# Patient Record
Sex: FEMALE | Race: WHITE | NOT HISPANIC OR LATINO | Employment: OTHER | URBAN - METROPOLITAN AREA
[De-identification: names, ages, dates, MRNs, and addresses within clinical notes are randomized per-mention and may not be internally consistent; named-entity substitution may affect disease eponyms.]

---

## 2017-01-05 ENCOUNTER — SKIN CANCER EXAM (OUTPATIENT)
Dept: URBAN - METROPOLITAN AREA CLINIC 31 | Facility: CLINIC | Age: 47
Setting detail: DERMATOLOGY
End: 2017-01-05

## 2017-01-05 PROCEDURE — 99214 OFFICE O/P EST MOD 30 MIN: CPT

## 2017-01-26 ENCOUNTER — SEE NOTE (OUTPATIENT)
Dept: URBAN - METROPOLITAN AREA CLINIC 31 | Facility: CLINIC | Age: 47
Setting detail: DERMATOLOGY
End: 2017-01-26

## 2017-01-26 PROBLEM — L57.0 ACTINIC KERATOSIS: Status: ACTIVE | Noted: 2017-01-26

## 2017-01-26 PROBLEM — L57.0 ACTINIC KERATOSIS: Status: RESOLVED | Noted: 2017-01-26

## 2017-01-26 PROCEDURE — 17000 DESTRUCT PREMALG LESION: CPT

## 2017-01-26 PROCEDURE — 99213 OFFICE O/P EST LOW 20 MIN: CPT

## 2017-01-26 PROCEDURE — 17003 DESTRUCT PREMALG LES 2-14: CPT

## 2017-03-14 ENCOUNTER — RX ONLY (RX ONLY)
Age: 47
End: 2017-03-14

## 2017-03-14 RX ORDER — FLUOCINOLONE ACETONIDE 0.1 MG/ML
1 APPLICATION SOLUTION TOPICAL DAILY
Qty: 60 | Refills: 2 | Status: DISCONTINUED
Start: 2017-03-14 | End: 2020-02-13

## 2017-04-21 ENCOUNTER — SEE NOTE (OUTPATIENT)
Dept: URBAN - METROPOLITAN AREA CLINIC 31 | Facility: CLINIC | Age: 47
Setting detail: DERMATOLOGY
End: 2017-04-21

## 2017-04-21 PROBLEM — L57.0 ACTINIC KERATOSIS: Status: RESOLVED | Noted: 2017-04-21

## 2017-04-21 PROBLEM — L57.0 ACTINIC KERATOSIS: Status: ACTIVE | Noted: 2017-04-21

## 2017-04-21 PROCEDURE — 17000 DESTRUCT PREMALG LESION: CPT

## 2017-04-21 PROCEDURE — 99212 OFFICE O/P EST SF 10 MIN: CPT

## 2017-05-19 ENCOUNTER — OTHER - SEE NOTE (OUTPATIENT)
Dept: URBAN - METROPOLITAN AREA CLINIC 31 | Facility: CLINIC | Age: 47
Setting detail: DERMATOLOGY
End: 2017-05-19

## 2017-05-19 PROBLEM — L57.0 ACTINIC KERATOSIS: Status: RESOLVED | Noted: 2017-05-19

## 2017-05-19 PROCEDURE — 11100 BX SKIN SUBCUTANEOUS&/MUCOUS MEMBRANE 1 LESION: CPT

## 2017-05-19 PROCEDURE — 99213 OFFICE O/P EST LOW 20 MIN: CPT

## 2017-12-19 ENCOUNTER — APPOINTMENT (RX ONLY)
Dept: URBAN - METROPOLITAN AREA CLINIC 12 | Facility: CLINIC | Age: 47
Setting detail: DERMATOLOGY
End: 2017-12-19

## 2017-12-19 DIAGNOSIS — Z41.9 ENCOUNTER FOR PROCEDURE FOR PURPOSES OTHER THAN REMEDYING HEALTH STATE, UNSPECIFIED: ICD-10-CM

## 2017-12-19 PROCEDURE — ? BOTOX

## 2017-12-19 PROCEDURE — ? CONSULTATION - BOTULINUM TOXIN

## 2017-12-19 NOTE — PROCEDURE: BOTOX
Additional Area 3 Location: necklace lines
David Units: 0
Map Statment: See attached map for complete details
Detail Level: Simple
Expiration Date (Month Year): 06/20
Show Price In Note?: yes
Postcare Instructions: Patient instructed to not lie down for 4 hours and limit physical activity for 24 hours. Patient instructed not to travel by airplane for 48 hours.
Additional Area 2 Location: muscle spasm
Bill Summary Price Listed Below, Or Bill Total Of Units X Price Per Unit?: Bill #Units x Price Per Unit
Consent: Written consent was obtained prior to the procedure. Risks, benefits, expectations and alternatives were discussed including, but not limited to, infection, bleeding, lid/brow ptosis, bruising, swelling, diplopia, temporary effects, incomplete chemical denervation and dissatisfaction with the cosmetic outcome. No guarantee or warranty was given or implied regarding longevity of results.
Lot #: [id number]
Forehead Units: 5
Additional Area 1 Location: chin
Administered By (Optional): Camille Pugh
Price Per Unit In $ (Use Numbers Only, No Text Please.): 17
Glabellar Complex Units: 7.5

## 2017-12-19 NOTE — PROCEDURE: CONSULTATION - BOTULINUM TOXIN
Nasal Root Primary Toxin Units (Estimated): 0
Detail Level: Detailed
Send Procedure Quote As Charge: No

## 2017-12-28 ENCOUNTER — SKIN CANCER EXAM (OUTPATIENT)
Dept: URBAN - METROPOLITAN AREA CLINIC 31 | Facility: CLINIC | Age: 47
Setting detail: DERMATOLOGY
End: 2017-12-28

## 2017-12-28 PROBLEM — L57.0 ACTINIC KERATOSIS: Status: RESOLVED | Noted: 2017-12-28

## 2017-12-28 PROBLEM — L57.0 ACTINIC KERATOSIS: Status: ACTIVE | Noted: 2017-12-28

## 2017-12-28 PROCEDURE — 99214 OFFICE O/P EST MOD 30 MIN: CPT

## 2017-12-28 PROCEDURE — 17000 DESTRUCT PREMALG LESION: CPT

## 2017-12-28 PROCEDURE — 11100 BX SKIN SUBCUTANEOUS&/MUCOUS MEMBRANE 1 LESION: CPT

## 2017-12-28 PROCEDURE — 17003 DESTRUCT PREMALG LES 2-14: CPT

## 2017-12-28 PROCEDURE — 11101 BIOPSY SKIN SUBQ&/MUCOUS MEMBRANE EA ADDL LESION: CPT

## 2017-12-28 RX ORDER — TAZAROTENE 1 MG/G
1 APPLICATION GEL CUTANEOUS QHS
Qty: 90 | Refills: 4
Start: 2017-12-28

## 2018-01-02 ENCOUNTER — APPOINTMENT (RX ONLY)
Dept: URBAN - METROPOLITAN AREA CLINIC 12 | Facility: CLINIC | Age: 48
Setting detail: DERMATOLOGY
End: 2018-01-02

## 2018-01-02 DIAGNOSIS — Z42.8 ENCOUNTER FOR OTHER PLASTIC AND RECONSTRUCTIVE SURGERY FOLLOWING MEDICAL PROCEDURE OR HEALED INJURY: ICD-10-CM

## 2018-01-02 PROCEDURE — ? BOTOX

## 2018-01-02 NOTE — PROCEDURE: BOTOX
Show Price In Note?: yes
Price Per Unit In $ (Use Numbers Only, No Text Please.): 14
Forehead Units: 0
Additional Area 3 Location: necklace lines
Bill Summary Price Listed Below, Or Bill Total Of Units X Price Per Unit?: Bill #Units x Price Per Unit
Expiration Date (Month Year): 08/20
Periorbital Skin Units: 10
Dilution (U/0.1 Cc): 5
Additional Area 2 Location: muscle spasm
Postcare Instructions: Patient instructed to not lie down for 4 hours and limit physical activity for 24 hours. Patient instructed not to travel by airplane for 48 hours.
Administered By (Optional): Camille Pugh
Glabellar Complex Units: 2.5
Additional Area 1 Location: chin
Consent: Written consent was obtained prior to the procedure. Risks, benefits, expectations and alternatives were discussed including, but not limited to, infection, bleeding, lid/brow ptosis, bruising, swelling, diplopia, temporary effects, incomplete chemical denervation and dissatisfaction with the cosmetic outcome. No guarantee or warranty was given or implied regarding longevity of results.
Map Statment: See attached map for complete details
Detail Level: Simple
Lot #: [id number]

## 2018-01-08 ENCOUNTER — RX ONLY (RX ONLY)
Age: 48
End: 2018-01-08

## 2018-01-08 RX ORDER — TAZAROTENE 1 MG/G
1 GM AEROSOL, FOAM TOPICAL QHS
Qty: 100 | Refills: 6 | Status: DISCONTINUED
Start: 2018-01-08 | End: 2019-01-22

## 2018-01-15 ENCOUNTER — APPOINTMENT (RX ONLY)
Dept: URBAN - METROPOLITAN AREA CLINIC 12 | Facility: CLINIC | Age: 48
Setting detail: DERMATOLOGY
End: 2018-01-15

## 2018-01-15 DIAGNOSIS — Z42.8 ENCOUNTER FOR OTHER PLASTIC AND RECONSTRUCTIVE SURGERY FOLLOWING MEDICAL PROCEDURE OR HEALED INJURY: ICD-10-CM

## 2018-01-15 PROCEDURE — ? BOTOX

## 2018-01-15 NOTE — PROCEDURE: BOTOX
Dilution (U/0.1 Cc): 5
Consent: Written consent was obtained prior to the procedure. Risks, benefits, expectations and alternatives were discussed including, but not limited to, infection, bleeding, lid/brow ptosis, bruising, swelling, diplopia, temporary effects, incomplete chemical denervation and dissatisfaction with the cosmetic outcome. No guarantee or warranty was given or implied regarding longevity of results.
Lot #: [id number]
Additional Area 1 Units: 0
Use Map Statement For Sites (Optional): Yes
Detail Level: Simple
Map Statment: See attached map for complete details
Additional Area 3 Location: necklace lines
Price Per Unit In $ (Use Numbers Only, No Text Please.): 12
Additional Area 1 Location: chin
Expiration Date (Month Year): 08/20
Bill Summary Price Listed Below, Or Bill Total Of Units X Price Per Unit?: Bill #Units x Price Per Unit
Administered By (Optional): Camille Pugh
Postcare Instructions: Patient instructed to not lie down for 4 hours and limit physical activity for 24 hours. Patient instructed not to travel by airplane for 48 hours.
Additional Area 2 Location: muscle spasm

## 2018-02-20 ENCOUNTER — FOLLOW UP (OUTPATIENT)
Dept: URBAN - METROPOLITAN AREA CLINIC 31 | Facility: CLINIC | Age: 48
Setting detail: DERMATOLOGY
End: 2018-02-20

## 2018-02-20 PROBLEM — L57.0 ACTINIC KERATOSIS: Status: RESOLVED | Noted: 2018-02-20

## 2018-02-20 PROCEDURE — 17000 DESTRUCT PREMALG LESION: CPT

## 2018-02-20 RX ORDER — INGENOL MEBUTATE 150 UG/G
1 UNIT GEL TOPICAL ADD'L SIG
Qty: 3 | Refills: 0 | Status: DISCONTINUED
Start: 2018-02-20 | End: 2019-07-03

## 2018-03-27 ENCOUNTER — APPOINTMENT (RX ONLY)
Dept: URBAN - METROPOLITAN AREA CLINIC 12 | Facility: CLINIC | Age: 48
Setting detail: DERMATOLOGY
End: 2018-03-27

## 2018-03-27 DIAGNOSIS — Z41.9 ENCOUNTER FOR PROCEDURE FOR PURPOSES OTHER THAN REMEDYING HEALTH STATE, UNSPECIFIED: ICD-10-CM

## 2018-03-27 PROCEDURE — ? BOTOX

## 2018-03-27 NOTE — PROCEDURE: BOTOX
Postcare Instructions: Patient instructed to not lie down for 4 hours and limit physical activity for 24 hours. Patient instructed not to travel by airplane for 48 hours.
Map Statment: See attached map for complete details
Use Map Statement For Sites (Optional): Yes
Administered By (Optional): Camille Pugh
Forehead Units: 15
Additional Area 2 Units: 0
Detail Level: Simple
Additional Area 2 Location: nasalis depressor
Price Per Unit In $ (Use Numbers Only, No Text Please.): 17
Dilution (U/0.1 Cc): 5
Consent: Written consent was obtained prior to the procedure. Risks, benefits, expectations and alternatives were discussed including, but not limited to, infection, bleeding, lid/brow ptosis, bruising, swelling, diplopia, temporary effects, incomplete chemical denervation and dissatisfaction with the cosmetic outcome. No guarantee or warranty was given or implied regarding longevity of results.
Additional Area 3 Location: tail end of brows
Additional Area 1 Location: chin
Bill Summary Price Listed Below, Or Bill Total Of Units X Price Per Unit?: Bill #Units x Price Per Unit

## 2018-04-10 ENCOUNTER — APPOINTMENT (RX ONLY)
Dept: URBAN - METROPOLITAN AREA CLINIC 12 | Facility: CLINIC | Age: 48
Setting detail: DERMATOLOGY
End: 2018-04-10

## 2018-04-10 DIAGNOSIS — Z42.8 ENCOUNTER FOR OTHER PLASTIC AND RECONSTRUCTIVE SURGERY FOLLOWING MEDICAL PROCEDURE OR HEALED INJURY: ICD-10-CM

## 2018-04-10 PROCEDURE — ? BOTOX

## 2018-04-10 NOTE — PROCEDURE: BOTOX
Summary Price $ (Use Numbers Only, No Text Please.): 210
Show Price In Note?: yes
Nasal Root Units: 0
Postcare Instructions: Patient instructed to not lie down for 4 hours and limit physical activity for 24 hours. Patient instructed not to travel by airplane for 48 hours.
Administered By (Optional): Camille Pugh
Additional Area 2 Location: nasalis depressor
Consent: Written consent was obtained prior to the procedure. Risks, benefits, expectations and alternatives were discussed including, but not limited to, infection, bleeding, lid/brow ptosis, bruising, swelling, diplopia, temporary effects, incomplete chemical denervation and dissatisfaction with the cosmetic outcome. No guarantee or warranty was given or implied regarding longevity of results.
Dilution (U/0.1 Cc): 5
Additional Area 3 Location: tail end of brows
Lot #: [id number]
Bill Summary Price Listed Below, Or Bill Total Of Units X Price Per Unit?: Bill Summary Price Below
Periorbital Skin Units: 7.5
Detail Level: Simple
Forehead Units: 15
Additional Area 1 Location: chin
Expiration Date (Month Year): October 2020
Map Statment: See attached map for complete details

## 2018-04-20 ENCOUNTER — FOLLOW UP (OUTPATIENT)
Dept: URBAN - METROPOLITAN AREA CLINIC 31 | Facility: CLINIC | Age: 48
Setting detail: DERMATOLOGY
End: 2018-04-20

## 2018-04-20 PROBLEM — L57.0 ACTINIC KERATOSIS: Status: RESOLVED | Noted: 2018-04-20

## 2018-04-20 PROCEDURE — 17000 DESTRUCT PREMALG LESION: CPT

## 2018-04-24 ENCOUNTER — APPOINTMENT (RX ONLY)
Dept: URBAN - METROPOLITAN AREA CLINIC 12 | Facility: CLINIC | Age: 48
Setting detail: DERMATOLOGY
End: 2018-04-24

## 2018-04-24 DIAGNOSIS — Z42.8 ENCOUNTER FOR OTHER PLASTIC AND RECONSTRUCTIVE SURGERY FOLLOWING MEDICAL PROCEDURE OR HEALED INJURY: ICD-10-CM

## 2018-04-24 PROCEDURE — ? BOTOX

## 2018-04-24 NOTE — PROCEDURE: BOTOX
Expiration Date (Month Year): December 2020
Additional Area 2 Location: nasalis depressor
Nasal Root Units: 0
Dilution (U/0.1 Cc): 5
Administered By (Optional): Camille Pugh
Show Price In Note?: yes
Additional Area 1 Location: chin
Additional Area 3 Location: tail end of brows
Lot #: [id number]
Consent: Written consent was obtained prior to the procedure. Risks, benefits, expectations and alternatives were discussed including, but not limited to, infection, bleeding, lid/brow ptosis, bruising, swelling, diplopia, temporary effects, incomplete chemical denervation and dissatisfaction with the cosmetic outcome. No guarantee or warranty was given or implied regarding longevity of results.
Bill Summary Price Listed Below, Or Bill Total Of Units X Price Per Unit?: Bill Summary Price Below
Postcare Instructions: Patient instructed to not lie down for 4 hours and limit physical activity for 24 hours. Patient instructed not to travel by airplane for 48 hours.
Detail Level: Simple
Map Statment: See attached map for complete details
Price Per Unit In $ (Use Numbers Only, No Text Please.): 17
Periorbital Skin Units: 1

## 2018-06-01 ENCOUNTER — APPOINTMENT (RX ONLY)
Dept: URBAN - METROPOLITAN AREA CLINIC 12 | Facility: CLINIC | Age: 48
Setting detail: DERMATOLOGY
End: 2018-06-01

## 2018-06-01 DIAGNOSIS — Z41.9 ENCOUNTER FOR PROCEDURE FOR PURPOSES OTHER THAN REMEDYING HEALTH STATE, UNSPECIFIED: ICD-10-CM

## 2018-06-01 PROCEDURE — ? BOTOX

## 2018-06-01 NOTE — PROCEDURE: BOTOX
Platsyma Units: 0
Lot #: [id number]
Dilution (U/0.1 Cc): 5
Consent: Written consent was obtained prior to the procedure. Risks, benefits, expectations and alternatives were discussed including, but not limited to, infection, bleeding, lid/brow ptosis, bruising, swelling, diplopia, temporary effects, incomplete chemical denervation and dissatisfaction with the cosmetic outcome. No guarantee or warranty was given or implied regarding longevity of results.
Additional Area 3 Location: tail end of brows
Additional Area 2 Location: nasalis depressor
Bill Summary Price Listed Below, Or Bill Total Of Units X Price Per Unit?: Bill #Units x Price Per Unit
Additional Area 1 Location: chin
Use Map Statement For Sites (Optional): Yes
Detail Level: Simple
Periorbital Skin Units: 20
Forehead Units: 2.5
Glabellar Complex Units: 7.5
Postcare Instructions: Patient instructed to not lie down for 4 hours and limit physical activity for 24 hours. Patient instructed not to travel by airplane for 48 hours.
Price Per Unit In $ (Use Numbers Only, No Text Please.): 17
Map Statment: See attached map for complete details
Administered By (Optional): Camille Pugh
Expiration Date (Month Year): 12/2020

## 2018-06-13 ENCOUNTER — APPOINTMENT (RX ONLY)
Dept: URBAN - METROPOLITAN AREA CLINIC 12 | Facility: CLINIC | Age: 48
Setting detail: DERMATOLOGY
End: 2018-06-13

## 2018-06-13 DIAGNOSIS — Z42.8 ENCOUNTER FOR OTHER PLASTIC AND RECONSTRUCTIVE SURGERY FOLLOWING MEDICAL PROCEDURE OR HEALED INJURY: ICD-10-CM

## 2018-06-13 PROCEDURE — ? BOTOX

## 2018-06-13 NOTE — PROCEDURE: BOTOX
Dilution (U/0.1 Cc): 5
Additional Area 3 Location: orbicularis
Price Per Unit In $ (Use Numbers Only, No Text Please.): 0
Postcare Instructions: Patient instructed to not lie down for 4 hours and limit physical activity for 24 hours. Patient instructed not to travel by airplane for 48 hours.
Detail Level: Simple
Lot #: [id number]
Administered By (Optional): Camille Pugh
Additional Area 1 Location: chin
Consent: Written consent was obtained prior to the procedure. Risks, benefits, expectations and alternatives were discussed including, but not limited to, infection, bleeding, lid/brow ptosis, bruising, swelling, diplopia, temporary effects, incomplete chemical denervation and dissatisfaction with the cosmetic outcome. No guarantee or warranty was given or implied regarding longevity of results.
Use Map Statement For Sites (Optional): Yes
Glabellar Complex Units: 1
Map Statment: See attached map for complete details
Expiration Date (Month Year): 12/2020
Additional Area 2 Location: nasalis depressor
Bill Summary Price Listed Below, Or Bill Total Of Units X Price Per Unit?: Bill #Units x Price Per Unit

## 2018-08-10 ENCOUNTER — APPOINTMENT (RX ONLY)
Dept: URBAN - METROPOLITAN AREA CLINIC 12 | Facility: CLINIC | Age: 48
Setting detail: DERMATOLOGY
End: 2018-08-10

## 2018-08-10 DIAGNOSIS — Z41.9 ENCOUNTER FOR PROCEDURE FOR PURPOSES OTHER THAN REMEDYING HEALTH STATE, UNSPECIFIED: ICD-10-CM

## 2018-08-10 PROCEDURE — ? BOTOX

## 2018-08-10 NOTE — PROCEDURE: BOTOX
Forehead Units: 10
Additional Area 1 Units: 0
Administered By (Optional): Camille Pugh
Periorbital Skin Units: 7.5
Additional Area 3 Location: tail end of brows
Show Price In Note?: yes
Expiration Date (Month Year): 12/2020
Price Per Unit In $ (Use Numbers Only, No Text Please.): 15
Consent: Written consent was obtained prior to the procedure. Risks, benefits, expectations and alternatives were discussed including, but not limited to, infection, bleeding, lid/brow ptosis, bruising, swelling, diplopia, temporary effects, incomplete chemical denervation and dissatisfaction with the cosmetic outcome. No guarantee or warranty was given or implied regarding longevity of results.
Detail Level: Simple
Lot #: [id number]
Dilution (U/0.1 Cc): 5
Bill Summary Price Listed Below, Or Bill Total Of Units X Price Per Unit?: Bill #Units x Price Per Unit
Additional Area 2 Location: nasalis depressor
Postcare Instructions: Patient instructed to not lie down for 4 hours and limit physical activity for 24 hours. Patient instructed not to travel by airplane for 48 hours.
Map Statment: See attached map for complete details
Additional Area 1 Location: chin

## 2018-08-31 ENCOUNTER — APPOINTMENT (RX ONLY)
Dept: URBAN - METROPOLITAN AREA CLINIC 12 | Facility: CLINIC | Age: 48
Setting detail: DERMATOLOGY
End: 2018-08-31

## 2018-08-31 DIAGNOSIS — Z42.8 ENCOUNTER FOR OTHER PLASTIC AND RECONSTRUCTIVE SURGERY FOLLOWING MEDICAL PROCEDURE OR HEALED INJURY: ICD-10-CM

## 2018-08-31 PROCEDURE — ? BOTOX

## 2018-08-31 NOTE — PROCEDURE: BOTOX
Forehead Units: 2.5
Additional Area 1 Location: chin
David Units: 0
Use Map Statement For Sites (Optional): Yes
Dilution (U/0.1 Cc): 5
Map Statment: See attached map for complete details
Consent: Written consent was obtained prior to the procedure. Risks, benefits, expectations and alternatives were discussed including, but not limited to, infection, bleeding, lid/brow ptosis, bruising, swelling, diplopia, temporary effects, incomplete chemical denervation and dissatisfaction with the cosmetic outcome. No guarantee or warranty was given or implied regarding longevity of results.
Detail Level: Simple
Expiration Date (Month Year): 03/2021
Administered By (Optional): Camille Pugh
Additional Area 2 Location: tail end of brow
Bill Summary Price Listed Below, Or Bill Total Of Units X Price Per Unit?: Bill #Units x Price Per Unit
Lot #: [id number]
Postcare Instructions: Patient instructed to not lie down for 4 hours and limit physical activity for 24 hours. Patient instructed not to travel by airplane for 48 hours.
Price Per Unit In $ (Use Numbers Only, No Text Please.): 14
Periorbital Skin Units: 12.5
Additional Area 3 Location: necklace line

## 2018-11-19 ENCOUNTER — APPOINTMENT (RX ONLY)
Dept: URBAN - METROPOLITAN AREA CLINIC 12 | Facility: CLINIC | Age: 48
Setting detail: DERMATOLOGY
End: 2018-11-19

## 2018-11-19 DIAGNOSIS — Z41.9 ENCOUNTER FOR PROCEDURE FOR PURPOSES OTHER THAN REMEDYING HEALTH STATE, UNSPECIFIED: ICD-10-CM

## 2018-11-19 PROCEDURE — ? BOTOX

## 2018-11-19 NOTE — PROCEDURE: BOTOX
Detail Level: Simple
Forehead Units: 5
Additional Area 1 Location: [facility]
Additional Area 1 Units: 0
Additional Area 3 Location: nasalis depressor
Price Per Unit In $ (Use Numbers Only, No Text Please.): 17
Reconstitution Date: 11/19/2018
Expiration Date (Month Year): 05/2021
Use Map Statement For Sites (Optional): Yes
Postcare Instructions: Patient instructed to not lie down for 4 hours and limit physical activity for 24 hours. Patient instructed not to travel by airplane for 48 hours.
Lot #: [id number]
Additional Area 2 Location: tail end of brow
Administered By (Optional): Camille Pugh
Map Statment: See attached map for complete details
Consent: Written consent was obtained prior to the procedure. Risks, benefits, expectations and alternatives were discussed including, but not limited to, infection, bleeding, lid/brow ptosis, bruising, swelling, diplopia, temporary effects, incomplete chemical denervation and dissatisfaction with the cosmetic outcome. No guarantee or warranty was given or implied regarding longevity of results.
Bill Summary Price Listed Below, Or Bill Total Of Units X Price Per Unit?: Bill Summary Price Below
Periorbital Skin Units: 15
Glabellar Complex Units: 10
Summary Price $ (Use Numbers Only, No Text Please.): 510.00

## 2018-12-11 ENCOUNTER — APPOINTMENT (RX ONLY)
Dept: URBAN - METROPOLITAN AREA CLINIC 12 | Facility: CLINIC | Age: 48
Setting detail: DERMATOLOGY
End: 2018-12-11

## 2018-12-11 DIAGNOSIS — Z42.8 ENCOUNTER FOR OTHER PLASTIC AND RECONSTRUCTIVE SURGERY FOLLOWING MEDICAL PROCEDURE OR HEALED INJURY: ICD-10-CM

## 2018-12-11 PROCEDURE — ? BOTOX

## 2018-12-11 NOTE — PROCEDURE: BOTOX
Reconstitution Date: 12/10/2018
Detail Level: Simple
Platsyma Units: 0
Bill Summary Price Listed Below, Or Bill Total Of Units X Price Per Unit?: Bill Summary Price Below
Dilution (U/0.1 Cc): 5
Lot #: [id number]
Administered By (Optional): Camille Pugh
Use Map Statement For Sites (Optional): Yes
Expiration Date (Month Year): 05/2021
Postcare Instructions: Patient instructed to not lie down for 4 hours and limit physical activity for 24 hours. Patient instructed not to travel by airplane for 48 hours.
Additional Area 2 Location: tail end of brow
Additional Area 1 Location: [facility]
Price Per Unit In $ (Use Numbers Only, No Text Please.): 14.00
Map Statment: See attached map for complete details
Additional Area 3 Location: nasalis depressor
Periorbital Skin Units: 17.5
Summary Price $ (Use Numbers Only, No Text Please.): 245.00
Consent: Written consent was obtained prior to the procedure. Risks, benefits, expectations and alternatives were discussed including, but not limited to, infection, bleeding, lid/brow ptosis, bruising, swelling, diplopia, temporary effects, incomplete chemical denervation and dissatisfaction with the cosmetic outcome. No guarantee or warranty was given or implied regarding longevity of results.

## 2018-12-21 ENCOUNTER — RX ONLY (RX ONLY)
Age: 48
End: 2018-12-21

## 2018-12-21 RX ORDER — FLUOCINOLONE ACETONIDE 0.1 MG/ML
ADD'L SIG ADD'L SIG SOLUTION TOPICAL ADD'L SIG
Qty: 60 | Refills: 2 | Status: DISCONTINUED
Start: 2018-12-21 | End: 2020-02-13

## 2019-01-22 ENCOUNTER — SKIN CANCER EXAM (OUTPATIENT)
Dept: URBAN - METROPOLITAN AREA CLINIC 31 | Facility: CLINIC | Age: 49
Setting detail: DERMATOLOGY
End: 2019-01-22

## 2019-01-22 ENCOUNTER — RX ONLY (RX ONLY)
Age: 49
End: 2019-01-22

## 2019-01-22 PROCEDURE — 99214 OFFICE O/P EST MOD 30 MIN: CPT

## 2019-01-22 RX ORDER — TAZAROTENE 1 MG/G
1 GM AEROSOL, FOAM TOPICAL QHS
Qty: 100 | Refills: 6 | Status: DISCONTINUED
Start: 2019-01-22 | End: 2020-02-13

## 2019-02-12 ENCOUNTER — APPOINTMENT (RX ONLY)
Dept: URBAN - METROPOLITAN AREA CLINIC 12 | Facility: CLINIC | Age: 49
Setting detail: DERMATOLOGY
End: 2019-02-12

## 2019-02-12 DIAGNOSIS — Z41.9 ENCOUNTER FOR PROCEDURE FOR PURPOSES OTHER THAN REMEDYING HEALTH STATE, UNSPECIFIED: ICD-10-CM

## 2019-02-12 PROCEDURE — ? BOTOX

## 2019-02-12 NOTE — PROCEDURE: BOTOX
David Units: 0
Show Price In Note?: yes
Glabellar Complex Units: 10
Dilution (U/0.1 Cc): 5
Additional Area 1 Location: [facility]
Bill Summary Price Listed Below, Or Bill Total Of Units X Price Per Unit?: Bill #Units x Price Per Unit
Postcare Instructions: Patient instructed to not lie down for 4 hours and limit physical activity for 24 hours. Patient instructed not to travel by airplane for 48 hours.
Map Statment: See attached map for complete details
Additional Area 3 Location: necklace lines
Expiration Date (Month Year): 08/2021
Price Per Unit In $ (Use Numbers Only, No Text Please.): 17
Detail Level: Simple
Additional Area 2 Location: Chin
Lot #: [id number]
Periorbital Skin Units: 20
Consent: Written consent was obtained prior to the procedure. Risks, benefits, expectations and alternatives were discussed including, but not limited to, infection, bleeding, lid/brow ptosis, bruising, swelling, diplopia, temporary effects, incomplete chemical denervation and dissatisfaction with the cosmetic outcome. No guarantee or warranty was given or implied regarding longevity of results.
Administered By (Optional): Camille Pugh

## 2019-02-25 ENCOUNTER — APPOINTMENT (RX ONLY)
Dept: URBAN - METROPOLITAN AREA CLINIC 12 | Facility: CLINIC | Age: 49
Setting detail: DERMATOLOGY
End: 2019-02-25

## 2019-02-25 DIAGNOSIS — Z42.8 ENCOUNTER FOR OTHER PLASTIC AND RECONSTRUCTIVE SURGERY FOLLOWING MEDICAL PROCEDURE OR HEALED INJURY: ICD-10-CM

## 2019-02-25 PROCEDURE — ? BOTOX

## 2019-02-25 NOTE — PROCEDURE: BOTOX
Additional Area 1 Location: [facility]
Additional Area 1 Units: 0
Additional Area 3 Location: necklace lines
Dilution (U/0.1 Cc): 5
Forehead Units: 7.5
Show Price In Note?: yes
Map Statment: See attached map for complete details
Price Per Unit In $ (Use Numbers Only, No Text Please.): 14
Detail Level: Simple
Lot #: [id number]
Administered By (Optional): Camille Pugh
Periorbital Skin Units: 2.5
Additional Area 2 Location: Chin
Expiration Date (Month Year): 04/2021
Bill Summary Price Listed Below, Or Bill Total Of Units X Price Per Unit?: Bill #Units x Price Per Unit
Consent: Written consent was obtained prior to the procedure. Risks, benefits, expectations and alternatives were discussed including, but not limited to, infection, bleeding, lid/brow ptosis, bruising, swelling, diplopia, temporary effects, incomplete chemical denervation and dissatisfaction with the cosmetic outcome. No guarantee or warranty was given or implied regarding longevity of results.
Postcare Instructions: Patient instructed to not lie down for 4 hours and limit physical activity for 24 hours. Patient instructed not to travel by airplane for 48 hours.

## 2019-05-14 ENCOUNTER — APPOINTMENT (RX ONLY)
Dept: URBAN - METROPOLITAN AREA CLINIC 12 | Facility: CLINIC | Age: 49
Setting detail: DERMATOLOGY
End: 2019-05-14

## 2019-05-14 DIAGNOSIS — Z41.9 ENCOUNTER FOR PROCEDURE FOR PURPOSES OTHER THAN REMEDYING HEALTH STATE, UNSPECIFIED: ICD-10-CM

## 2019-05-14 PROCEDURE — ? BOTOX

## 2019-05-14 NOTE — PROCEDURE: BOTOX
Periorbital Skin Units: 20
Platsyma Units: 0
Administered By (Optional): Camille Pugh
Lot #: [id number]
Show Price In Note?: yes
Glabellar Complex Units: 7.5
Bill Summary Price Listed Below, Or Bill Total Of Units X Price Per Unit?: Bill #Units x Price Per Unit
Additional Area 1 Location: in the brow
Dilution (U/0.1 Cc): 5
Postcare Instructions: Patient instructed to not lie down for 4 hours and limit physical activity for 24 hours. Patient instructed not to travel by airplane for 48 hours.
Price Per Unit In $ (Use Numbers Only, No Text Please.): 17
Additional Area 2 Location: Chin
Expiration Date (Month Year): 08/2021
Map Statment: See attached map for complete details
Consent: Written consent was obtained prior to the procedure. Risks, benefits, expectations and alternatives were discussed including, but not limited to, infection, bleeding, lid/brow ptosis, bruising, swelling, diplopia, temporary effects, incomplete chemical denervation and dissatisfaction with the cosmetic outcome. No guarantee or warranty was given or implied regarding longevity of results.
Detail Level: Simple
Additional Area 3 Location: necklace lines

## 2019-05-29 ENCOUNTER — APPOINTMENT (RX ONLY)
Dept: URBAN - METROPOLITAN AREA CLINIC 12 | Facility: CLINIC | Age: 49
Setting detail: DERMATOLOGY
End: 2019-05-29

## 2019-05-29 DIAGNOSIS — Z42.8 ENCOUNTER FOR OTHER PLASTIC AND RECONSTRUCTIVE SURGERY FOLLOWING MEDICAL PROCEDURE OR HEALED INJURY: ICD-10-CM

## 2019-05-29 PROCEDURE — ? BOTOX

## 2019-05-29 NOTE — PROCEDURE: BOTOX
Perioral Units: 0
Additional Area 3 Location: necklace lines
Lot #: [id number]
Map Statment: See attached map for complete details
Periorbital Skin Units: 2.5
Consent: Written consent was obtained prior to the procedure. Risks, benefits, expectations and alternatives were discussed including, but not limited to, infection, bleeding, lid/brow ptosis, bruising, swelling, diplopia, temporary effects, incomplete chemical denervation and dissatisfaction with the cosmetic outcome. No guarantee or warranty was given or implied regarding longevity of results.
Show Price In Note?: yes
Additional Area 2 Location: Chin
Detail Level: Simple
Administered By (Optional): Camille Pugh
Postcare Instructions: Patient instructed to not lie down for 4 hours and limit physical activity for 24 hours. Patient instructed not to travel by airplane for 48 hours.
Bill Summary Price Listed Below, Or Bill Total Of Units X Price Per Unit?: Bill #Units x Price Per Unit
Price Per Unit In $ (Use Numbers Only, No Text Please.): 14
Additional Area 1 Location: in the brow
Expiration Date (Month Year): 08/2021
Forehead Units: 7.5
Dilution (U/0.1 Cc): 5

## 2019-07-03 ENCOUNTER — RX ONLY (RX ONLY)
Age: 49
End: 2019-07-03

## 2019-07-03 RX ORDER — INGENOL MEBUTATE 150 UG/G
1 UNIT GEL TOPICAL ADD'L SIG
Qty: 3 | Refills: 0 | Status: DISCONTINUED
Start: 2019-07-03 | End: 2020-02-13

## 2019-07-23 PROBLEM — L57.0 ACTINIC KERATOSIS: Status: RESOLVED | Noted: 2019-07-23

## 2019-08-14 ENCOUNTER — APPOINTMENT (RX ONLY)
Dept: URBAN - METROPOLITAN AREA CLINIC 12 | Facility: CLINIC | Age: 49
Setting detail: DERMATOLOGY
End: 2019-08-14

## 2019-08-14 DIAGNOSIS — Z41.9 ENCOUNTER FOR PROCEDURE FOR PURPOSES OTHER THAN REMEDYING HEALTH STATE, UNSPECIFIED: ICD-10-CM

## 2019-08-14 PROCEDURE — ? BOTOX

## 2019-08-14 NOTE — PROCEDURE: BOTOX
Additional Area 3 Location: [facility]
Additional Area 3 Units: 0
Postcare Instructions: Patient instructed to not lie down for 4 hours and limit physical activity for 24 hours. Patient instructed not to travel by airplane for 48 hours.
Additional Area 1 Location: in the brow
Consent: Written consent was obtained prior to the procedure. Risks, benefits, expectations and alternatives were discussed including, but not limited to, infection, bleeding, lid/brow ptosis, bruising, swelling, diplopia, temporary effects, incomplete chemical denervation and dissatisfaction with the cosmetic outcome. No guarantee or warranty was given or implied regarding longevity of results.
Price Per Unit In $ (Use Numbers Only, No Text Please.): 17
Glabellar Complex Units: 5
Map Statment: See attached map for complete details
Forehead Units: 7.5
Administered By (Optional): Camille Pugh
Periorbital Skin Units: 20
Use Map Statement For Sites (Optional): Yes
Lot #: [id number]
Bill Summary Price Listed Below, Or Bill Total Of Units X Price Per Unit?: Bill #Units x Price Per Unit
Expiration Date (Month Year): 01/2022
Detail Level: Simple
Additional Area 2 Location: Chin

## 2019-08-19 ENCOUNTER — RX ONLY (RX ONLY)
Age: 49
End: 2019-08-19

## 2019-08-19 RX ORDER — FLUOROURACIL 50 MG/G
1 APPLICATION CREAM TOPICAL NIGHTLY
Qty: 40 | Refills: 1 | Status: DISCONTINUED
Start: 2019-08-19 | End: 2020-02-13

## 2019-09-03 ENCOUNTER — APPOINTMENT (RX ONLY)
Dept: URBAN - METROPOLITAN AREA CLINIC 12 | Facility: CLINIC | Age: 49
Setting detail: DERMATOLOGY
End: 2019-09-03

## 2019-09-03 DIAGNOSIS — Z42.8 ENCOUNTER FOR OTHER PLASTIC AND RECONSTRUCTIVE SURGERY FOLLOWING MEDICAL PROCEDURE OR HEALED INJURY: ICD-10-CM

## 2019-09-03 PROCEDURE — ? BOTOX

## 2019-09-03 NOTE — PROCEDURE: BOTOX
Lot #: [id number]
Perioral Units: 0
Consent: Written consent was obtained prior to the procedure. Risks, benefits, expectations and alternatives were discussed including, but not limited to, infection, bleeding, lid/brow ptosis, bruising, swelling, diplopia, temporary effects, incomplete chemical denervation and dissatisfaction with the cosmetic outcome. No guarantee or warranty was given or implied regarding longevity of results.
Map Statment: See attached map for complete details
Detail Level: Simple
Additional Area 1 Location: nasalis depressor
Use Map Statement For Sites (Optional): Yes
Forehead Units: 5
Bill Summary Price Listed Below, Or Bill Total Of Units X Price Per Unit?: Bill #Units x Price Per Unit
Additional Area 3 Location: necklace lines
Price Per Unit In $ (Use Numbers Only, No Text Please.): 14
Periorbital Skin Units: 2.5
Postcare Instructions: Patient instructed to not lie down for 4 hours and limit physical activity for 24 hours. Patient instructed not to travel by airplane for 48 hours.
Expiration Date (Month Year): 01/2022
Additional Area 2 Location: Chin
Administered By (Optional): Camille Pugh

## 2019-11-22 ENCOUNTER — APPOINTMENT (RX ONLY)
Dept: URBAN - METROPOLITAN AREA CLINIC 12 | Facility: CLINIC | Age: 49
Setting detail: DERMATOLOGY
End: 2019-11-22

## 2019-11-22 DIAGNOSIS — Z41.9 ENCOUNTER FOR PROCEDURE FOR PURPOSES OTHER THAN REMEDYING HEALTH STATE, UNSPECIFIED: ICD-10-CM

## 2019-11-22 PROCEDURE — ? XEOMIN

## 2019-11-22 NOTE — PROCEDURE: XEOMIN
Expiration Date (Month Year): 09/2021
Detail Level: Detailed
Additional Area 2 Units: 0
Use Map Statement For Sites (Optional): No
Forehead Units: 5
Consent: Written consent was obtained prior to the procedure. Risks, benefits, expectations and alternatives were discussed including, but not limited to, infection, bleeding, lid/brow ptosis, bruising, swelling, diplopia, temporary effects, incomplete chemical denervation and dissatisfaction with the cosmetic outcome. No guarantee or warranty was given or implied regarding longevity of results.
Map Statement: See attached map for complete details
Lot #: [id number]
Periorbital Skin Units: 25
Show Price In Note?: yes
Bill Summary Price Listed Below, Or Bill Total Of Units X Price Per Unit?: Bill #Units x Price Per Unit
Price Per Unit In $ (Use Numbers Only, No Text Please.): 17
Glabellar Complex Units: 10
Postcare Instructions: Patient instructed to not lie down for 4 hours and limit physical activity for 24 hours. Patient instructed not to travel by airplane for 48 hours.

## 2019-12-04 ENCOUNTER — APPOINTMENT (RX ONLY)
Dept: URBAN - METROPOLITAN AREA CLINIC 12 | Facility: CLINIC | Age: 49
Setting detail: DERMATOLOGY
End: 2019-12-04

## 2019-12-04 DIAGNOSIS — Z42.8 ENCOUNTER FOR OTHER PLASTIC AND RECONSTRUCTIVE SURGERY FOLLOWING MEDICAL PROCEDURE OR HEALED INJURY: ICD-10-CM

## 2019-12-04 PROCEDURE — ? XEOMIN

## 2019-12-04 NOTE — PROCEDURE: XEOMIN
Periorbital Skin Units: 5
Expiration Date (Month Year): 10/2021
Price Per Unit In $ (Use Numbers Only, No Text Please.): 14
Nasal Root Units: 0
Lot #: [id number]
Map Statement: See attached map for complete details
Bill Summary Price Listed Below, Or Bill Total Of Units X Price Per Unit?: Bill #Units x Price Per Unit
Show Price In Note?: yes
Consent: Written consent was obtained prior to the procedure. Risks, benefits, expectations and alternatives were discussed including, but not limited to, infection, bleeding, lid/brow ptosis, bruising, swelling, diplopia, temporary effects, incomplete chemical denervation and dissatisfaction with the cosmetic outcome. No guarantee or warranty was given or implied regarding longevity of results.
Postcare Instructions: Patient instructed to not lie down for 4 hours and limit physical activity for 24 hours. Patient instructed not to travel by airplane for 48 hours.
Detail Level: Detailed
Use Map Statement For Sites (Optional): No

## 2020-02-13 ENCOUNTER — SKIN CANCER EXAM (OUTPATIENT)
Dept: URBAN - METROPOLITAN AREA CLINIC 31 | Facility: CLINIC | Age: 50
Setting detail: DERMATOLOGY
End: 2020-02-13

## 2020-02-13 DIAGNOSIS — L57.0 ACTINIC KERATOSIS: ICD-10-CM

## 2020-02-13 PROBLEM — D18.01 HEMANGIOMA OF SKIN AND SUBCUTANEOUS TISSUE: Status: ACTIVE | Noted: 2020-02-13

## 2020-02-13 PROBLEM — D18.01 HEMANGIOMA OF SKIN AND SUBCUTANEOUS TISSUE: Status: RESOLVED | Noted: 2020-02-13

## 2020-02-13 PROCEDURE — 99214 OFFICE O/P EST MOD 30 MIN: CPT

## 2020-02-13 PROCEDURE — 11300 SHAVE SKIN LESION 0.5 CM/<: CPT

## 2020-02-13 RX ORDER — TAZAROTENE 1 MG/G
1 APPLICATION GEL CUTANEOUS QHS
Qty: 90 | Refills: 6
Start: 2020-02-13

## 2020-02-21 ENCOUNTER — APPOINTMENT (RX ONLY)
Dept: URBAN - METROPOLITAN AREA CLINIC 12 | Facility: CLINIC | Age: 50
Setting detail: DERMATOLOGY
End: 2020-02-21

## 2020-02-21 DIAGNOSIS — Z41.9 ENCOUNTER FOR PROCEDURE FOR PURPOSES OTHER THAN REMEDYING HEALTH STATE, UNSPECIFIED: ICD-10-CM

## 2020-02-21 PROCEDURE — ? XEOMIN

## 2020-02-21 NOTE — PROCEDURE: XEOMIN
Show Price In Note?: yes
Glabellar Complex Units: 10
Dilution (U/0.1 Cc): 5
Bill Summary Price Listed Below, Or Bill Total Of Units X Price Per Unit?: Bill #Units x Price Per Unit
Use Map Statement For Sites (Optional): No
Additional Area 2 Units: 0
Detail Level: Detailed
Map Statement: See attached map for complete details
Expiration Date (Month Year): 01/2022
Postcare Instructions: Patient instructed to not lie down for 4 hours and limit physical activity for 24 hours. Patient instructed not to travel by airplane for 48 hours.
Price Per Unit In $ (Use Numbers Only, No Text Please.): 17
Periorbital Skin Units: 25
Lot #: [id number]
Consent: Written consent was obtained prior to the procedure. Risks, benefits, expectations and alternatives were discussed including, but not limited to, infection, bleeding, lid/brow ptosis, bruising, swelling, diplopia, temporary effects, incomplete chemical denervation and dissatisfaction with the cosmetic outcome. No guarantee or warranty was given or implied regarding longevity of results.

## 2020-05-06 ENCOUNTER — APPOINTMENT (RX ONLY)
Dept: URBAN - METROPOLITAN AREA CLINIC 12 | Facility: CLINIC | Age: 50
Setting detail: DERMATOLOGY
End: 2020-05-06

## 2020-05-06 DIAGNOSIS — Z41.9 ENCOUNTER FOR PROCEDURE FOR PURPOSES OTHER THAN REMEDYING HEALTH STATE, UNSPECIFIED: ICD-10-CM

## 2020-05-06 PROCEDURE — ? BOTOX

## 2020-05-06 NOTE — PROCEDURE: BOTOX
Lot #: [id number]
Bill Summary Price Listed Below, Or Bill Total Of Units X Price Per Unit?: Bill #Units x Price Per Unit
Price Per Unit In $ (Use Numbers Only, No Text Please.): 17
Map Statment: See attached map for complete details
Forehead Units: 5
Expiration Date (Month Year): 09/2022
Nasal Root Units: 0
Consent: Written consent was obtained prior to the procedure. Risks, benefits, expectations and alternatives were discussed including, but not limited to, infection, bleeding, lid/brow ptosis, bruising, swelling, diplopia, temporary effects, incomplete chemical denervation and dissatisfaction with the cosmetic outcome. No guarantee or warranty was given or implied regarding longevity of results.
Postcare Instructions: Patient instructed to not lie down for 4 hours and limit physical activity for 24 hours. Patient instructed not to travel by airplane for 48 hours.
Detail Level: Simple
Use Map Statement For Sites (Optional): Yes
Periorbital Skin Units: 25
Additional Area 2 Location: Chin
Administered By (Optional): Camille Pugh
Additional Area 3 Location: necklace lines
Additional Area 1 Location: in the brow
Glabellar Complex Units: 10

## 2020-05-15 ENCOUNTER — APPOINTMENT (RX ONLY)
Dept: URBAN - METROPOLITAN AREA CLINIC 12 | Facility: CLINIC | Age: 50
Setting detail: DERMATOLOGY
End: 2020-05-15

## 2020-05-15 DIAGNOSIS — Z42.8 ENCOUNTER FOR OTHER PLASTIC AND RECONSTRUCTIVE SURGERY FOLLOWING MEDICAL PROCEDURE OR HEALED INJURY: ICD-10-CM

## 2020-05-15 PROCEDURE — ? BOTOX

## 2020-05-15 NOTE — PROCEDURE: BOTOX
Use Map Statement For Sites (Optional): Yes
Dilution (U/0.1 Cc): 5
Additional Area 3 Units: 0
Additional Area 2 Location: Chin
Consent: Written consent was obtained prior to the procedure. Risks, benefits, expectations and alternatives were discussed including, but not limited to, infection, bleeding, lid/brow ptosis, bruising, swelling, diplopia, temporary effects, incomplete chemical denervation and dissatisfaction with the cosmetic outcome. No guarantee or warranty was given or implied regarding longevity of results.
Periorbital Skin Units: 17.5
Detail Level: Simple
Postcare Instructions: Patient instructed to not lie down for 4 hours and limit physical activity for 24 hours. Patient instructed not to travel by airplane for 48 hours.
Lot #: [id number]
Additional Area 1 Location: in the brow
Price Per Unit In $ (Use Numbers Only, No Text Please.): 13
Map Statment: See attached map for complete details
Expiration Date (Month Year): 09/2022
Bill Summary Price Listed Below, Or Bill Total Of Units X Price Per Unit?: Bill #Units x Price Per Unit
Additional Area 3 Location: nasalis depressor
Administered By (Optional): Camille Pugh

## 2020-08-18 ENCOUNTER — RX ONLY (RX ONLY)
Age: 50
End: 2020-08-18

## 2020-08-18 RX ORDER — FLUOCINOLONE ACETONIDE 0.1 MG/ML
1 ML SOLUTION TOPICAL NIGHTLY
Qty: 60 | Refills: 0
Start: 2020-08-18

## 2020-08-31 ENCOUNTER — APPOINTMENT (RX ONLY)
Dept: URBAN - METROPOLITAN AREA CLINIC 12 | Facility: CLINIC | Age: 50
Setting detail: DERMATOLOGY
End: 2020-08-31

## 2020-08-31 DIAGNOSIS — Z41.9 ENCOUNTER FOR PROCEDURE FOR PURPOSES OTHER THAN REMEDYING HEALTH STATE, UNSPECIFIED: ICD-10-CM

## 2020-08-31 PROCEDURE — ? XEOMIN

## 2020-08-31 NOTE — PROCEDURE: XEOMIN
Use Map Statement For Sites (Optional): No
Expiration Date (Month Year): 04/2022
Periorbital Skin Units: 25
Dilution (U/0.1 Cc): 5
Show Price In Note?: yes
Additional Area 1 Units: 0
Price Per Unit In $ (Use Numbers Only, No Text Please.): 17
Consent: Written consent was obtained prior to the procedure. Risks, benefits, expectations and alternatives were discussed including, but not limited to, infection, bleeding, lid/brow ptosis, bruising, swelling, diplopia, temporary effects, incomplete chemical denervation and dissatisfaction with the cosmetic outcome. No guarantee or warranty was given or implied regarding longevity of results.
Postcare Instructions: Patient instructed to not lie down for 4 hours and limit physical activity for 24 hours. Patient instructed not to travel by airplane for 48 hours.
Lot #: [id number]
Bill Summary Price Listed Below, Or Bill Total Of Units X Price Per Unit?: Bill #Units x Price Per Unit
Detail Level: Detailed
Map Statement: See attached map for complete details
Glabellar Complex Units: 10

## 2020-09-14 ENCOUNTER — APPOINTMENT (RX ONLY)
Dept: URBAN - METROPOLITAN AREA CLINIC 12 | Facility: CLINIC | Age: 50
Setting detail: DERMATOLOGY
End: 2020-09-14

## 2020-09-14 DIAGNOSIS — Z42.8 ENCOUNTER FOR OTHER PLASTIC AND RECONSTRUCTIVE SURGERY FOLLOWING MEDICAL PROCEDURE OR HEALED INJURY: ICD-10-CM

## 2020-09-14 PROCEDURE — ? XEOMIN

## 2020-09-21 ENCOUNTER — APPOINTMENT (RX ONLY)
Dept: URBAN - METROPOLITAN AREA CLINIC 12 | Facility: CLINIC | Age: 50
Setting detail: DERMATOLOGY
End: 2020-09-21

## 2020-11-02 ENCOUNTER — WORRISOME GROWTH - SEE NOTE (OUTPATIENT)
Dept: URBAN - METROPOLITAN AREA CLINIC 31 | Facility: CLINIC | Age: 50
Setting detail: DERMATOLOGY
End: 2020-11-02

## 2020-11-02 DIAGNOSIS — I83.11 VARICOSE VEINS OF RIGHT LOWER EXTREMITY WITH INFLAMMATION: ICD-10-CM

## 2020-11-02 DIAGNOSIS — B00.9 HERPESVIRAL INFECTION, UNSPECIFIED: ICD-10-CM

## 2020-11-02 DIAGNOSIS — L72.0 EPIDERMAL CYST: ICD-10-CM

## 2020-11-02 DIAGNOSIS — L71.8 OTHER ROSACEA: ICD-10-CM

## 2020-11-02 PROBLEM — I78.8 OTHER DISEASES OF CAPILLARIES: Status: RESOLVED | Noted: 2020-11-02

## 2020-11-02 PROBLEM — I78.8 OTHER DISEASES OF CAPILLARIES: Status: ACTIVE | Noted: 2020-11-02

## 2020-11-02 PROBLEM — L57.0 ACTINIC KERATOSIS: Status: RESOLVED | Noted: 2020-11-02

## 2020-11-02 PROCEDURE — 99213 OFFICE O/P EST LOW 20 MIN: CPT

## 2020-11-02 PROCEDURE — 17000 DESTRUCT PREMALG LESION: CPT

## 2020-12-09 ENCOUNTER — APPOINTMENT (RX ONLY)
Dept: URBAN - METROPOLITAN AREA CLINIC 12 | Facility: CLINIC | Age: 50
Setting detail: DERMATOLOGY
End: 2020-12-09

## 2020-12-09 DIAGNOSIS — Z41.9 ENCOUNTER FOR PROCEDURE FOR PURPOSES OTHER THAN REMEDYING HEALTH STATE, UNSPECIFIED: ICD-10-CM

## 2020-12-09 PROCEDURE — ? XEOMIN

## 2020-12-09 NOTE — PROCEDURE: XEOMIN
Bill Summary Price Listed Below, Or Bill Total Of Units X Price Per Unit?: Bill #Units x Price Per Unit
Postcare Instructions: Patient instructed to not lie down for 4 hours and limit physical activity for 24 hours. Patient instructed not to travel by airplane for 48 hours.
Additional Area 1 Units: 0
Periorbital Skin Units: 25
Lot #: [id number]
Expiration Date (Month Year): 12/2021
Glabellar Complex Units: 10
Use Map Statement For Sites (Optional): No
Price Per Unit In $ (Use Numbers Only, No Text Please.): 17
Dilution (U/0.1 Cc): 5
Consent: Written consent was obtained prior to the procedure. Risks, benefits, expectations and alternatives were discussed including, but not limited to, infection, bleeding, lid/brow ptosis, bruising, swelling, diplopia, temporary effects, incomplete chemical denervation and dissatisfaction with the cosmetic outcome. No guarantee or warranty was given or implied regarding longevity of results.
Map Statement: See attached map for complete details
Show Price In Note?: yes
Detail Level: Detailed

## 2020-12-23 ENCOUNTER — RX ONLY (RX ONLY)
Age: 50
End: 2020-12-23

## 2020-12-23 ENCOUNTER — APPOINTMENT (RX ONLY)
Dept: URBAN - METROPOLITAN AREA CLINIC 12 | Facility: CLINIC | Age: 50
Setting detail: DERMATOLOGY
End: 2020-12-23

## 2020-12-23 ENCOUNTER — WORRISOME GROWTH - SEE NOTE (OUTPATIENT)
Dept: URBAN - METROPOLITAN AREA CLINIC 32 | Facility: CLINIC | Age: 50
Setting detail: DERMATOLOGY
End: 2020-12-23

## 2020-12-23 DIAGNOSIS — L23.9 ALLERGIC CONTACT DERMATITIS, UNSPECIFIED CAUSE: ICD-10-CM

## 2020-12-23 DIAGNOSIS — Z42.8 ENCOUNTER FOR OTHER PLASTIC AND RECONSTRUCTIVE SURGERY FOLLOWING MEDICAL PROCEDURE OR HEALED INJURY: ICD-10-CM

## 2020-12-23 PROBLEM — L57.0 ACTINIC KERATOSIS: Status: RESOLVED | Noted: 2020-12-23

## 2020-12-23 PROCEDURE — 99213 OFFICE O/P EST LOW 20 MIN: CPT

## 2020-12-23 PROCEDURE — ? XEOMIN

## 2020-12-23 PROCEDURE — 17003 DESTRUCT PREMALG LES 2-14: CPT

## 2020-12-23 PROCEDURE — 17000 DESTRUCT PREMALG LESION: CPT

## 2020-12-23 RX ORDER — CLINDAMYCIN PHOSPHATE 10 MG/G
1 APPLICATION GEL TOPICAL BID
Qty: 30 | Refills: 6
Start: 2020-12-23

## 2020-12-23 NOTE — PROCEDURE: XEOMIN
Show Price In Note?: yes
Map Statement: See attached map for complete details
Additional Area 1 Units: 0
Periorbital Skin Units: 15
Bill Summary Price Listed Below, Or Bill Total Of Units X Price Per Unit?: Bill #Units x Price Per Unit
Lot #: [id number]
Detail Level: Detailed
Use Map Statement For Sites (Optional): No
Expiration Date (Month Year): 10/2022
Postcare Instructions: Patient instructed to not lie down for 4 hours and limit physical activity for 24 hours. Patient instructed not to travel by airplane for 48 hours.
Price Per Unit In $ (Use Numbers Only, No Text Please.): 14
Dilution (U/0.1 Cc): 5
Consent: Written consent was obtained prior to the procedure. Risks, benefits, expectations and alternatives were discussed including, but not limited to, infection, bleeding, lid/brow ptosis, bruising, swelling, diplopia, temporary effects, incomplete chemical denervation and dissatisfaction with the cosmetic outcome. No guarantee or warranty was given or implied regarding longevity of results.

## 2021-02-18 ENCOUNTER — SKIN CANCER EXAM (OUTPATIENT)
Dept: URBAN - METROPOLITAN AREA CLINIC 31 | Facility: CLINIC | Age: 51
Setting detail: DERMATOLOGY
End: 2021-02-18

## 2021-02-18 DIAGNOSIS — Z41.9 ENCOUNTER FOR PROCEDURE FOR PURPOSES OTHER THAN REMEDYING HEALTH STATE, UNSPECIFIED: ICD-10-CM

## 2021-02-18 PROBLEM — L82.1 OTHER SEBORRHEIC KERATOSIS: Status: RESOLVED | Noted: 2021-02-18

## 2021-02-18 PROBLEM — L82.1 OTHER SEBORRHEIC KERATOSIS: Status: ACTIVE | Noted: 2021-02-18

## 2021-02-18 PROBLEM — L57.0 ACTINIC KERATOSIS: Status: RESOLVED | Noted: 2021-02-18

## 2021-02-18 PROBLEM — D18.01 HEMANGIOMA OF SKIN AND SUBCUTANEOUS TISSUE: Status: RESOLVED | Noted: 2021-02-18

## 2021-02-18 PROBLEM — D18.01 HEMANGIOMA OF SKIN AND SUBCUTANEOUS TISSUE: Status: ACTIVE | Noted: 2021-02-18

## 2021-02-18 PROCEDURE — 17000 DESTRUCT PREMALG LESION: CPT

## 2021-02-18 PROCEDURE — 99214 OFFICE O/P EST MOD 30 MIN: CPT

## 2021-03-22 ENCOUNTER — APPOINTMENT (RX ONLY)
Dept: URBAN - METROPOLITAN AREA CLINIC 12 | Facility: CLINIC | Age: 51
Setting detail: DERMATOLOGY
End: 2021-03-22

## 2021-03-22 DIAGNOSIS — Z41.9 ENCOUNTER FOR PROCEDURE FOR PURPOSES OTHER THAN REMEDYING HEALTH STATE, UNSPECIFIED: ICD-10-CM

## 2021-03-22 PROCEDURE — ? XEOMIN

## 2021-03-22 NOTE — PROCEDURE: XEOMIN
Nasal Root Units: 0
Bill Summary Price Listed Below, Or Bill Total Of Units X Price Per Unit?: Bill #Units x Price Per Unit
Show Price In Note?: yes
Detail Level: Detailed
Use Map Statement For Sites (Optional): No
Price Per Unit In $ (Use Numbers Only, No Text Please.): 17
Forehead Units: 5
Expiration Date (Month Year): 12/2022
Map Statement: See attached map for complete details
Glabellar Complex Units: 15
Consent: Written consent was obtained prior to the procedure. Risks, benefits, expectations and alternatives were discussed including, but not limited to, infection, bleeding, lid/brow ptosis, bruising, swelling, diplopia, temporary effects, incomplete chemical denervation and dissatisfaction with the cosmetic outcome. No guarantee or warranty was given or implied regarding longevity of results.
Lot #: [id number]
Periorbital Skin Units: 25
Postcare Instructions: Patient instructed to not lie down for 4 hours and limit physical activity for 24 hours. Patient instructed not to travel by airplane for 48 hours.

## 2021-04-07 ENCOUNTER — APPOINTMENT (RX ONLY)
Dept: URBAN - METROPOLITAN AREA CLINIC 12 | Facility: CLINIC | Age: 51
Setting detail: DERMATOLOGY
End: 2021-04-07

## 2021-04-07 DIAGNOSIS — Z42.8 ENCOUNTER FOR OTHER PLASTIC AND RECONSTRUCTIVE SURGERY FOLLOWING MEDICAL PROCEDURE OR HEALED INJURY: ICD-10-CM

## 2021-04-07 PROCEDURE — ? XEOMIN

## 2021-04-07 NOTE — PROCEDURE: XEOMIN
Forehead Units: 7.5
Price Per Unit In $ (Use Numbers Only, No Text Please.): 0
Consent: Written consent was obtained prior to the procedure. Risks, benefits, expectations and alternatives were discussed including, but not limited to, infection, bleeding, lid/brow ptosis, bruising, swelling, diplopia, temporary effects, incomplete chemical denervation and dissatisfaction with the cosmetic outcome. No guarantee or warranty was given or implied regarding longevity of results.
Expiration Date (Month Year): 12/2022
Dilution (U/0.1 Cc): 5
Use Map Statement For Sites (Optional): No
Map Statement: See attached map for complete details
Lot #: [id number]
Postcare Instructions: Patient instructed to not lie down for 4 hours and limit physical activity for 24 hours. Patient instructed not to travel by airplane for 48 hours.
Periorbital Skin Units: 12.5
Show Price In Note?: yes
Bill Summary Price Listed Below, Or Bill Total Of Units X Price Per Unit?: Bill #Units x Price Per Unit
Detail Level: Detailed

## 2021-05-10 NOTE — PROCEDURE: XEOMIN
Please complete advance directive and return to clinic    Schedule the following -    1. Holter monitor for 48 hrs     2. Mammogram due     Med changes - Fluconazole for the yeast vaginitis   
Price Per Unit In $ (Use Numbers Only, No Text Please.): 12
Postcare Instructions: Patient instructed to not lie down for 4 hours and limit physical activity for 24 hours. Patient instructed not to travel by airplane for 48 hours.
Periorbital Skin Units: 12.5
Expiration Date (Month Year): 04/2022
Additional Area 1 Units: 0
Map Statement: See attached map for complete details
Consent: Written consent was obtained prior to the procedure. Risks, benefits, expectations and alternatives were discussed including, but not limited to, infection, bleeding, lid/brow ptosis, bruising, swelling, diplopia, temporary effects, incomplete chemical denervation and dissatisfaction with the cosmetic outcome. No guarantee or warranty was given or implied regarding longevity of results.
Detail Level: Detailed
Show Price In Note?: yes
Use Map Statement For Sites (Optional): No
Lot #: [id number]
Dilution (U/0.1 Cc): 5
Bill Summary Price Listed Below, Or Bill Total Of Units X Price Per Unit?: Bill #Units x Price Per Unit
Forehead Units: 2.5

## 2021-06-22 ENCOUNTER — APPOINTMENT (RX ONLY)
Dept: URBAN - METROPOLITAN AREA CLINIC 12 | Facility: CLINIC | Age: 51
Setting detail: DERMATOLOGY
End: 2021-06-22

## 2021-06-22 DIAGNOSIS — Z41.9 ENCOUNTER FOR PROCEDURE FOR PURPOSES OTHER THAN REMEDYING HEALTH STATE, UNSPECIFIED: ICD-10-CM

## 2021-06-22 PROCEDURE — ? XEOMIN

## 2021-06-22 NOTE — PROCEDURE: XEOMIN
Postcare Instructions: Patient instructed to not lie down for 4 hours and limit physical activity for 24 hours. Patient instructed not to travel by airplane for 48 hours.
Periorbital Skin Units: 32.5
Lot #: [id number]
Use Map Statement For Sites (Optional): No
Map Statement: See attached map for complete details
Bill Summary Price Listed Below, Or Bill Total Of Units X Price Per Unit?: Bill #Units x Price Per Unit
Additional Area 1 Units: 0
Show Price In Note?: yes
Expiration Date (Month Year): 01/2023
Dilution (U/0.1 Cc): 5
Price Per Unit In $ (Use Numbers Only, No Text Please.): 15
Forehead Units: 10
Consent: Written consent was obtained prior to the procedure. Risks, benefits, expectations and alternatives were discussed including, but not limited to, infection, bleeding, lid/brow ptosis, bruising, swelling, diplopia, temporary effects, incomplete chemical denervation and dissatisfaction with the cosmetic outcome. No guarantee or warranty was given or implied regarding longevity of results.
Detail Level: Detailed
Glabellar Complex Units: 12.5

## 2021-07-20 ENCOUNTER — APPOINTMENT (RX ONLY)
Dept: URBAN - METROPOLITAN AREA CLINIC 12 | Facility: CLINIC | Age: 51
Setting detail: DERMATOLOGY
End: 2021-07-20

## 2021-07-20 DIAGNOSIS — Z42.8 ENCOUNTER FOR OTHER PLASTIC AND RECONSTRUCTIVE SURGERY FOLLOWING MEDICAL PROCEDURE OR HEALED INJURY: ICD-10-CM

## 2021-07-20 PROCEDURE — ? XEOMIN

## 2021-07-20 NOTE — PROCEDURE: XEOMIN
Map Statement: See attached map for complete details
Bill Summary Price Listed Below, Or Bill Total Of Units X Price Per Unit?: Bill #Units x Price Per Unit
Nasal Root Units: 0
Show Price In Note?: yes
Price Per Unit In $ (Use Numbers Only, No Text Please.): 14
Dilution (U/0.1 Cc): 5
Consent: Written consent was obtained prior to the procedure. Risks, benefits, expectations and alternatives were discussed including, but not limited to, infection, bleeding, lid/brow ptosis, bruising, swelling, diplopia, temporary effects, incomplete chemical denervation and dissatisfaction with the cosmetic outcome. No guarantee or warranty was given or implied regarding longevity of results.
Detail Level: Detailed
Forehead Units: 2.5
Expiration Date (Month Year): 03/2023
Postcare Instructions: Patient instructed to not lie down for 4 hours and limit physical activity for 24 hours. Patient instructed not to travel by airplane for 48 hours.
Use Map Statement For Sites (Optional): No
Lot #: [id number]
Periorbital Skin Units: 7.5

## 2021-09-20 ENCOUNTER — APPOINTMENT (RX ONLY)
Dept: URBAN - METROPOLITAN AREA CLINIC 12 | Facility: CLINIC | Age: 51
Setting detail: DERMATOLOGY
End: 2021-09-20

## 2021-09-20 DIAGNOSIS — Z41.9 ENCOUNTER FOR PROCEDURE FOR PURPOSES OTHER THAN REMEDYING HEALTH STATE, UNSPECIFIED: ICD-10-CM

## 2021-09-20 PROCEDURE — ? XEOMIN

## 2021-09-20 NOTE — PROCEDURE: XEOMIN
Use Map Statement For Sites (Optional): No
Periorbital Skin Units: 30
Lot #: [id number]
Postcare Instructions: Patient instructed to not lie down for 4 hours and limit physical activity for 24 hours. Patient instructed not to travel by airplane for 48 hours.
Map Statement: See attached map for complete details
Expiration Date (Month Year): 10/2023
Additional Area 2 Units: 0
Detail Level: Detailed
Price Per Unit In $ (Use Numbers Only, No Text Please.): 15
Consent: Written consent was obtained prior to the procedure. Risks, benefits, expectations and alternatives were discussed including, but not limited to, infection, bleeding, lid/brow ptosis, bruising, swelling, diplopia, temporary effects, incomplete chemical denervation and dissatisfaction with the cosmetic outcome. No guarantee or warranty was given or implied regarding longevity of results.
Dilution (U/0.1 Cc): 5
Forehead Units: 7.5
Bill Summary Price Listed Below, Or Bill Total Of Units X Price Per Unit?: Bill #Units x Price Per Unit
Show Price In Note?: yes
Glabellar Complex Units: 12.5

## 2021-10-01 ENCOUNTER — APPOINTMENT (RX ONLY)
Dept: URBAN - METROPOLITAN AREA CLINIC 12 | Facility: CLINIC | Age: 51
Setting detail: DERMATOLOGY
End: 2021-10-01

## 2021-10-01 DIAGNOSIS — Z42.8 ENCOUNTER FOR OTHER PLASTIC AND RECONSTRUCTIVE SURGERY FOLLOWING MEDICAL PROCEDURE OR HEALED INJURY: ICD-10-CM

## 2021-10-01 PROCEDURE — ? XEOMIN

## 2021-10-01 NOTE — PROCEDURE: XEOMIN
Lot #: [id number]
Use Map Statement For Sites (Optional): No
Map Statement: See attached map for complete details
Show Price In Note?: yes
Bill Summary Price Listed Below, Or Bill Total Of Units X Price Per Unit?: Bill #Units x Price Per Unit
Periorbital Skin Units: 22.5
Postcare Instructions: Patient instructed to not lie down for 4 hours and limit physical activity for 24 hours. Patient instructed not to travel by airplane for 48 hours.
Glabellar Complex Units: 10
Forehead Units: 5
Consent: Written consent was obtained prior to the procedure. Risks, benefits, expectations and alternatives were discussed including, but not limited to, infection, bleeding, lid/brow ptosis, bruising, swelling, diplopia, temporary effects, incomplete chemical denervation and dissatisfaction with the cosmetic outcome. No guarantee or warranty was given or implied regarding longevity of results.
Additional Area 2 Units: 0
Detail Level: Detailed
Topical Anesthesia?: 23% lidocaine, 7% tetracaine
Expiration Date (Month Year): 10/2023
Price Per Unit In $ (Use Numbers Only, No Text Please.): 14

## 2021-11-11 ENCOUNTER — WORRISOME GROWTH - SEE NOTE (OUTPATIENT)
Dept: URBAN - METROPOLITAN AREA CLINIC 31 | Facility: CLINIC | Age: 51
Setting detail: DERMATOLOGY
End: 2021-11-11

## 2021-11-11 DIAGNOSIS — D22.5 MELANOCYTIC NEVI OF TRUNK: ICD-10-CM

## 2021-11-11 DIAGNOSIS — L82.1 OTHER SEBORRHEIC KERATOSIS: ICD-10-CM

## 2021-11-11 PROBLEM — L57.0 ACTINIC KERATOSIS: Status: RESOLVED | Noted: 2021-11-11

## 2021-11-11 PROCEDURE — 17000 DESTRUCT PREMALG LESION: CPT

## 2021-12-13 ENCOUNTER — APPOINTMENT (RX ONLY)
Dept: URBAN - METROPOLITAN AREA CLINIC 12 | Facility: CLINIC | Age: 51
Setting detail: DERMATOLOGY
End: 2021-12-13

## 2021-12-13 DIAGNOSIS — Z41.9 ENCOUNTER FOR PROCEDURE FOR PURPOSES OTHER THAN REMEDYING HEALTH STATE, UNSPECIFIED: ICD-10-CM

## 2021-12-13 PROCEDURE — ? BOTOX

## 2021-12-13 NOTE — PROCEDURE: BOTOX
Nasal Root Units: 0
Show Price In Note?: yes
Postcare Instructions: Patient instructed to not lie down for 4 hours and limit physical activity for 24 hours. Patient instructed not to travel by airplane for 48 hours.
Periorbital Skin Units: 30
Map Statment: See attached map for complete details
Additional Area 1 Location: nasalis depressor
Lot #: [id number]
Price Per Unit In $ (Use Numbers Only, No Text Please.): 14
Additional Area 3 Location: [facility]
Forehead Units: 12.5
Bill Summary Price Listed Below, Or Bill Total Of Units X Price Per Unit?: Bill #Units x Price Per Unit
Glabellar Complex Units: 7.5
Administered By (Optional): Camille Pugh
Expiration Date (Month Year): 03/2024
Detail Level: Simple
Additional Area 2 Location: chin
Dilution (U/0.1 Cc): 5
Consent: Written consent was obtained prior to the procedure. Risks, benefits, expectations and alternatives were discussed including, but not limited to, infection, bleeding, lid/brow ptosis, bruising, swelling, diplopia, temporary effects, incomplete chemical denervation and dissatisfaction with the cosmetic outcome. No guarantee or warranty was given or implied regarding longevity of results.

## 2021-12-27 ENCOUNTER — APPOINTMENT (RX ONLY)
Dept: URBAN - METROPOLITAN AREA CLINIC 12 | Facility: CLINIC | Age: 51
Setting detail: DERMATOLOGY
End: 2021-12-27

## 2021-12-27 DIAGNOSIS — Z42.8 ENCOUNTER FOR OTHER PLASTIC AND RECONSTRUCTIVE SURGERY FOLLOWING MEDICAL PROCEDURE OR HEALED INJURY: ICD-10-CM

## 2021-12-27 PROCEDURE — ? BOTOX

## 2021-12-27 NOTE — PROCEDURE: BOTOX
Additional Area 2 Units: 0
Dilution (U/0.1 Cc): 5
Forehead Units: 10
Additional Area 1 Location: nasalis depressor
Use Map Statement For Sites (Optional): Yes
Expiration Date (Month Year): 03/2024
Additional Area 3 Location: right upper lip
Additional Area 2 Location: chin
Periorbital Skin Units: 20
Map Statment: See attached map for complete details
Lot #: [id number]
Detail Level: Simple
Postcare Instructions: Patient instructed to not lie down for 4 hours and limit physical activity for 24 hours. Patient instructed not to travel by airplane for 48 hours.
Bill Summary Price Listed Below, Or Bill Total Of Units X Price Per Unit?: Bill #Units x Price Per Unit
Administered By (Optional): Camille Pugh
Consent: Written consent was obtained prior to the procedure. Risks, benefits, expectations and alternatives were discussed including, but not limited to, infection, bleeding, lid/brow ptosis, bruising, swelling, diplopia, temporary effects, incomplete chemical denervation and dissatisfaction with the cosmetic outcome. No guarantee or warranty was given or implied regarding longevity of results.
Price Per Unit In $ (Use Numbers Only, No Text Please.): 12

## 2022-01-17 ENCOUNTER — FOLLOW UP (OUTPATIENT)
Dept: URBAN - METROPOLITAN AREA CLINIC 31 | Facility: CLINIC | Age: 52
Setting detail: DERMATOLOGY
End: 2022-01-17

## 2022-01-17 DIAGNOSIS — D48.5 NEOPLASM OF UNCERTAIN BEHAVIOR OF SKIN: ICD-10-CM

## 2022-01-17 PROBLEM — L57.0 ACTINIC KERATOSIS: Status: RESOLVED | Noted: 2022-01-17

## 2022-01-17 PROCEDURE — 17000 DESTRUCT PREMALG LESION: CPT

## 2022-02-24 ENCOUNTER — SKIN CANCER EXAM (OUTPATIENT)
Dept: URBAN - METROPOLITAN AREA CLINIC 31 | Facility: CLINIC | Age: 52
Setting detail: DERMATOLOGY
End: 2022-02-24

## 2022-02-24 DIAGNOSIS — Z41.9 ENCOUNTER FOR PROCEDURE FOR PURPOSES OTHER THAN REMEDYING HEALTH STATE, UNSPECIFIED: ICD-10-CM

## 2022-02-24 PROBLEM — D18.01 HEMANGIOMA OF SKIN AND SUBCUTANEOUS TISSUE: Status: ACTIVE | Noted: 2022-02-24

## 2022-02-24 PROBLEM — D18.01 HEMANGIOMA OF SKIN AND SUBCUTANEOUS TISSUE: Status: RESOLVED | Noted: 2022-02-24

## 2022-02-24 PROCEDURE — 99213 OFFICE O/P EST LOW 20 MIN: CPT

## 2022-03-07 ENCOUNTER — RX ONLY (RX ONLY)
Age: 52
End: 2022-03-07

## 2022-03-07 RX ORDER — TAZAROTENE 1 MG/G
1 APPLICATION GEL CUTANEOUS QHS
Qty: 30 | Refills: 4
Start: 2022-03-07

## 2022-03-16 ENCOUNTER — APPOINTMENT (RX ONLY)
Dept: URBAN - METROPOLITAN AREA CLINIC 12 | Facility: CLINIC | Age: 52
Setting detail: DERMATOLOGY
End: 2022-03-16

## 2022-03-16 DIAGNOSIS — Z41.9 ENCOUNTER FOR PROCEDURE FOR PURPOSES OTHER THAN REMEDYING HEALTH STATE, UNSPECIFIED: ICD-10-CM

## 2022-03-16 PROCEDURE — ? BOTOX

## 2022-03-16 NOTE — PROCEDURE: BOTOX
Additional Area 2 Location: chin
Detail Level: Simple
David Units: 0
Consent: Written consent was obtained prior to the procedure. Risks, benefits, expectations and alternatives were discussed including, but not limited to, infection, bleeding, lid/brow ptosis, bruising, swelling, diplopia, temporary effects, incomplete chemical denervation and dissatisfaction with the cosmetic outcome. No guarantee or warranty was given or implied regarding longevity of results.
Periorbital Skin Units: 30
Expiration Date (Month Year): 05/2024
Additional Area 3 Location: lateral platysmal bands
Administered By (Optional): Camille Pugh
Postcare Instructions: Patient instructed to not lie down for 4 hours and limit physical activity for 24 hours. Patient instructed not to travel by airplane for 48 hours.
Glabellar Complex Units: 12.5
Show Price In Note?: yes
Lot #: [id number]
Additional Area 1 Location: nasalis depressor
Price Per Unit In $ (Use Numbers Only, No Text Please.): 17
Bill Summary Price Listed Below, Or Bill Total Of Units X Price Per Unit?: Bill #Units x Price Per Unit
Forehead Units: 7.5
Map Statment: See attached map for complete details
Dilution (U/0.1 Cc): 5

## 2022-03-30 ENCOUNTER — APPOINTMENT (RX ONLY)
Dept: URBAN - METROPOLITAN AREA CLINIC 12 | Facility: CLINIC | Age: 52
Setting detail: DERMATOLOGY
End: 2022-03-30

## 2022-03-30 DIAGNOSIS — Z42.8 ENCOUNTER FOR OTHER PLASTIC AND RECONSTRUCTIVE SURGERY FOLLOWING MEDICAL PROCEDURE OR HEALED INJURY: ICD-10-CM

## 2022-03-30 PROCEDURE — ? BOTOX

## 2022-03-30 NOTE — PROCEDURE: BOTOX
Additional Area 3 Units: 0
Additional Area 2 Location: chin
Administered By (Optional): Camille Pugh
Price Per Unit In $ (Use Numbers Only, No Text Please.): 14
Dilution (U/0.1 Cc): 5
Periorbital Skin Units: 15
Additional Area 3 Location: lateral platysmal bands
Expiration Date (Month Year): 05/2024
Use Map Statement For Sites (Optional): Yes
Postcare Instructions: Patient instructed to not lie down for 4 hours and limit physical activity for 24 hours. Patient instructed not to travel by airplane for 48 hours.
Detail Level: Simple
Lot #: [id number]
Consent: Written consent was obtained prior to the procedure. Risks, benefits, expectations and alternatives were discussed including, but not limited to, infection, bleeding, lid/brow ptosis, bruising, swelling, diplopia, temporary effects, incomplete chemical denervation and dissatisfaction with the cosmetic outcome. No guarantee or warranty was given or implied regarding longevity of results.
Bill Summary Price Listed Below, Or Bill Total Of Units X Price Per Unit?: Bill #Units x Price Per Unit
Additional Area 1 Location: nasalis depressor
Map Statment: See attached map for complete details

## 2022-06-13 ENCOUNTER — APPOINTMENT (RX ONLY)
Dept: URBAN - METROPOLITAN AREA CLINIC 12 | Facility: CLINIC | Age: 52
Setting detail: DERMATOLOGY
End: 2022-06-13

## 2022-06-13 DIAGNOSIS — Z41.9 ENCOUNTER FOR PROCEDURE FOR PURPOSES OTHER THAN REMEDYING HEALTH STATE, UNSPECIFIED: ICD-10-CM

## 2022-06-13 PROCEDURE — ? BOTOX

## 2022-06-13 NOTE — PROCEDURE: BOTOX
Postcare Instructions: Patient instructed to not lie down for 4 hours and limit physical activity for 24 hours. Patient instructed not to travel by airplane for 48 hours.
Additional Area 2 Units: 0
Show Price In Note?: yes
Periorbital Skin Units: 25
Map Statment: See attached map for complete details
Bill Summary Price Listed Below, Or Bill Total Of Units X Price Per Unit?: Bill #Units x Price Per Unit
Price Per Unit In $ (Use Numbers Only, No Text Please.): 17
Forehead Units: 7.5
Dilution (U/0.1 Cc): 5
Expiration Date (Month Year): 10/2023
Consent: Written consent was obtained prior to the procedure. Risks, benefits, expectations and alternatives were discussed including, but not limited to, infection, bleeding, lid/brow ptosis, bruising, swelling, diplopia, temporary effects, incomplete chemical denervation and dissatisfaction with the cosmetic outcome. No guarantee or warranty was given or implied regarding longevity of results.
Additional Area 2 Location: chin
Additional Area 1 Location: upper lip
Additional Area 3 Location: lateral platysmal bands
Glabellar Complex Units: 12.5
Lot #: [id number]
Administered By (Optional): Camille Pugh
Detail Level: Simple

## 2022-07-14 ENCOUNTER — APPOINTMENT (RX ONLY)
Dept: URBAN - METROPOLITAN AREA CLINIC 12 | Facility: CLINIC | Age: 52
Setting detail: DERMATOLOGY
End: 2022-07-14

## 2022-07-14 ENCOUNTER — SEE NOTE (OUTPATIENT)
Dept: URBAN - METROPOLITAN AREA CLINIC 31 | Facility: CLINIC | Age: 52
Setting detail: DERMATOLOGY
End: 2022-07-14

## 2022-07-14 DIAGNOSIS — L20.84 INTRINSIC (ALLERGIC) ECZEMA: ICD-10-CM

## 2022-07-14 DIAGNOSIS — L20.82 FLEXURAL ECZEMA: ICD-10-CM

## 2022-07-14 DIAGNOSIS — Z41.9 ENCOUNTER FOR PROCEDURE FOR PURPOSES OTHER THAN REMEDYING HEALTH STATE, UNSPECIFIED: ICD-10-CM

## 2022-07-14 PROCEDURE — 99213 OFFICE O/P EST LOW 20 MIN: CPT

## 2022-07-14 PROCEDURE — ? BOTOX

## 2022-07-14 NOTE — PROCEDURE: BOTOX
Price Per Unit In $ (Use Numbers Only, No Text Please.): 14
Additional Area 1 Location: upper lip
Consent: Written consent was obtained prior to the procedure. Risks, benefits, expectations and alternatives were discussed including, but not limited to, infection, bleeding, lid/brow ptosis, bruising, swelling, diplopia, temporary effects, incomplete chemical denervation and dissatisfaction with the cosmetic outcome. No guarantee or warranty was given or implied regarding longevity of results.
David Units: 0
Dilution (U/0.1 Cc): 5
Expiration Date (Month Year): 05/2024
Bill Summary Price Listed Below, Or Bill Total Of Units X Price Per Unit?: Bill #Units x Price Per Unit
Periorbital Skin Units: 17.5
Map Statment: See attached map for complete details
Lot #: [id number]
Postcare Instructions: Patient instructed to not lie down for 4 hours and limit physical activity for 24 hours. Patient instructed not to travel by airplane for 48 hours.
Administered By (Optional): Camille Pugh
Detail Level: Simple
Use Map Statement For Sites (Optional): Yes
Additional Area 2 Location: chin
Additional Area 3 Location: lateral platysmal bands

## 2022-09-06 ENCOUNTER — APPOINTMENT (RX ONLY)
Dept: URBAN - METROPOLITAN AREA CLINIC 12 | Facility: CLINIC | Age: 52
Setting detail: DERMATOLOGY
End: 2022-09-06

## 2022-09-06 DIAGNOSIS — Z41.9 ENCOUNTER FOR PROCEDURE FOR PURPOSES OTHER THAN REMEDYING HEALTH STATE, UNSPECIFIED: ICD-10-CM

## 2022-09-06 PROCEDURE — ? BOTOX

## 2022-09-06 NOTE — PROCEDURE: BOTOX
Glabellar Complex Units: 0
Additional Area 2 Location: chin
Forehead Units: 10
Expiration Date (Month Year): 07/2024
Use Map Statement For Sites (Optional): Yes
Postcare Instructions: Patient instructed to not lie down for 4 hours and limit physical activity for 24 hours. Patient instructed not to travel by airplane for 48 hours.
Additional Area 3 Location: lateral platysmal bands
Lot #: [id number]
Administered By (Optional): Camille Pugh
Periorbital Skin Units: 25
Map Statment: See attached map for complete details
Detail Level: Simple
Additional Area 1 Location: upper lip
Dilution (U/0.1 Cc): 5
Consent: Written consent was obtained prior to the procedure. Risks, benefits, expectations and alternatives were discussed including, but not limited to, infection, bleeding, lid/brow ptosis, bruising, swelling, diplopia, temporary effects, incomplete chemical denervation and dissatisfaction with the cosmetic outcome. No guarantee or warranty was given or implied regarding longevity of results.
Bill Summary Price Listed Below, Or Bill Total Of Units X Price Per Unit?: Bill #Units x Price Per Unit
Price Per Unit In $ (Use Numbers Only, No Text Please.): 17

## 2022-09-20 ENCOUNTER — APPOINTMENT (RX ONLY)
Dept: URBAN - METROPOLITAN AREA CLINIC 12 | Facility: CLINIC | Age: 52
Setting detail: DERMATOLOGY
End: 2022-09-20

## 2022-09-20 DIAGNOSIS — Z42.8 ENCOUNTER FOR OTHER PLASTIC AND RECONSTRUCTIVE SURGERY FOLLOWING MEDICAL PROCEDURE OR HEALED INJURY: ICD-10-CM

## 2022-09-20 PROCEDURE — ? OTHER (COSMETIC)

## 2022-09-20 NOTE — PROCEDURE: OTHER (COSMETIC)
Price $ (Use Numbers Only, No Text Please.): 0
Other (Free Text): No Charge per Camille Pugh RN
Detail Level: Simple
Sticky Other (Free Text): Sample Xeomin \\n32.5 u X used in the crows feet\\nLot: [id number]\\nExp: 08/2024\\nNOT BARCODED

## 2022-10-17 ENCOUNTER — APPOINTMENT (OUTPATIENT)
Dept: URBAN - METROPOLITAN AREA CLINIC 206 | Age: 52
Setting detail: DERMATOLOGY
End: 2022-10-19

## 2022-10-17 DIAGNOSIS — B07.8 OTHER VIRAL WARTS: ICD-10-CM

## 2022-10-17 DIAGNOSIS — L57.0 ACTINIC KERATOSIS: ICD-10-CM

## 2022-10-17 PROCEDURE — OTHER LIQUID NITROGEN: OTHER

## 2022-10-17 PROCEDURE — 17000 DESTRUCT PREMALG LESION: CPT | Mod: 59

## 2022-10-17 PROCEDURE — 17110 DESTRUCT B9 LESION 1-14: CPT

## 2022-10-17 ASSESSMENT — LOCATION SIMPLE DESCRIPTION DERM
LOCATION SIMPLE: LEFT PRETIBIAL REGION
LOCATION SIMPLE: LEFT FOREHEAD
LOCATION SIMPLE: RIGHT THIGH
LOCATION SIMPLE: RIGHT KNEE

## 2022-10-17 ASSESSMENT — LOCATION ZONE DERM
LOCATION ZONE: LEG
LOCATION ZONE: FACE

## 2022-10-17 ASSESSMENT — LOCATION DETAILED DESCRIPTION DERM
LOCATION DETAILED: LEFT MEDIAL FOREHEAD
LOCATION DETAILED: RIGHT ANTERIOR DISTAL THIGH
LOCATION DETAILED: LEFT PROXIMAL PRETIBIAL REGION
LOCATION DETAILED: RIGHT KNEE

## 2022-10-17 NOTE — PROCEDURE: LIQUID NITROGEN
Show Applicator Variable?: Yes
Application Tool (Optional): Liquid Nitrogen Sprayer
Render Note In Bullet Format When Appropriate: No
Duration Of Freeze Thaw-Cycle (Seconds): 5
Post-Care Instructions: I reviewed with the patient in detail post-care instructions. Patient is to wear sunprotection (spf 30+) and avoid picking at any of the treated lesions. Patient may apply Vaseline or Aquaphor to crusted or scabbing areas.
Number Of Freeze-Thaw Cycles: 2 freeze-thaw cycles
Consent: The patient's consent was obtained including but not limited to risks of crusting, scabbing, blistering, scarring, darker or lighter pigmentary change, recurrence, incomplete removal and infection.
Detail Level: Detailed
Medical Necessity Information: It is in your best interest to select a reason for this procedure from the list below. All of these items fulfill various CMS LCD requirements except the new and changing color options.
Medical Necessity Clause: This procedure was medically necessary because the lesions that were treated were:
Spray Paint Text: The liquid nitrogen was applied to the skin utilizing a spray paint frosting technique.
Duration Of Freeze Thaw-Cycle (Seconds): 5-10
Post-Care Instructions: I reviewed with the patient in detail post-care instructions. Patient is to wear sunprotection, and avoid picking at any of the treated lesions. Pt may apply Vaseline to crusted or scabbing areas.

## 2022-12-12 ENCOUNTER — APPOINTMENT (RX ONLY)
Dept: URBAN - METROPOLITAN AREA CLINIC 12 | Facility: CLINIC | Age: 52
Setting detail: DERMATOLOGY
End: 2022-12-12

## 2022-12-12 DIAGNOSIS — Z41.9 ENCOUNTER FOR PROCEDURE FOR PURPOSES OTHER THAN REMEDYING HEALTH STATE, UNSPECIFIED: ICD-10-CM

## 2022-12-12 PROCEDURE — ? XEOMIN

## 2022-12-12 NOTE — PROCEDURE: XEOMIN
Show Price In Note?: yes
Lot #: [id number]
Periorbital Skin Units: 20
Nasal Root Units: 0
Expiration Date (Month Year): 12/24
Map Statement: See attached map for complete details
Use Map Statement For Sites (Optional): No
Detail Level: Detailed
Forehead Units: 10
Dilution (U/0.1 Cc): 5
Consent: Written consent was obtained prior to the procedure. Risks, benefits, expectations and alternatives were discussed including, but not limited to, infection, bleeding, lid/brow ptosis, bruising, swelling, diplopia, temporary effects, incomplete chemical denervation and dissatisfaction with the cosmetic outcome. No guarantee or warranty was given or implied regarding longevity of results.
Price Per Unit In $ (Use Numbers Only, No Text Please.): 17
Bill Summary Price Listed Below, Or Bill Total Of Units X Price Per Unit?: Bill #Units x Price Per Unit
Postcare Instructions: Patient instructed to not lie down for 4 hours and limit physical activity for 24 hours. Patient instructed not to travel by airplane for 48 hours.
Statement Selected

## 2022-12-19 ENCOUNTER — APPOINTMENT (OUTPATIENT)
Dept: URBAN - METROPOLITAN AREA CLINIC 206 | Age: 52
Setting detail: DERMATOLOGY
End: 2022-12-26

## 2022-12-19 DIAGNOSIS — B07.8 OTHER VIRAL WARTS: ICD-10-CM

## 2022-12-19 PROCEDURE — OTHER LIQUID NITROGEN: OTHER

## 2022-12-19 PROCEDURE — OTHER COUNSELING: OTHER

## 2022-12-19 PROCEDURE — OTHER OTHER: OTHER

## 2022-12-19 PROCEDURE — 17111 DESTRUCT LESION 15 OR MORE: CPT

## 2022-12-19 ASSESSMENT — LOCATION SIMPLE DESCRIPTION DERM
LOCATION SIMPLE: LEFT PRETIBIAL REGION
LOCATION SIMPLE: RIGHT ANKLE
LOCATION SIMPLE: LEFT POSTERIOR THIGH
LOCATION SIMPLE: RIGHT KNEE
LOCATION SIMPLE: LEFT KNEE
LOCATION SIMPLE: RIGHT PRETIBIAL REGION
LOCATION SIMPLE: LEFT FOOT
LOCATION SIMPLE: RIGHT THIGH
LOCATION SIMPLE: LEFT THIGH

## 2022-12-19 ASSESSMENT — LOCATION DETAILED DESCRIPTION DERM
LOCATION DETAILED: RIGHT ANTERIOR LATERAL MALLEOLUS
LOCATION DETAILED: LEFT MEDIAL DORSAL FOOT
LOCATION DETAILED: LEFT ANTERIOR PROXIMAL THIGH
LOCATION DETAILED: RIGHT ANTERIOR DISTAL THIGH
LOCATION DETAILED: LEFT LATERAL DISTAL PRETIBIAL REGION
LOCATION DETAILED: LEFT MEDIAL KNEE
LOCATION DETAILED: RIGHT ANKLE
LOCATION DETAILED: LEFT DISTAL PRETIBIAL REGION
LOCATION DETAILED: LEFT DISTAL POSTERIOR THIGH
LOCATION DETAILED: RIGHT DISTAL PRETIBIAL REGION
LOCATION DETAILED: RIGHT PROXIMAL PRETIBIAL REGION
LOCATION DETAILED: RIGHT KNEE
LOCATION DETAILED: LEFT MEDIAL DISTAL PRETIBIAL REGION

## 2022-12-19 ASSESSMENT — LOCATION ZONE DERM
LOCATION ZONE: LEG
LOCATION ZONE: FEET

## 2022-12-19 NOTE — PROCEDURE: OTHER
Detail Level: Zone
Note Text (......Xxx Chief Complaint.): This diagnosis correlates with the
Render Risk Assessment In Note?: no
Other (Free Text): -recommend keeping covered or shaving around warts

## 2022-12-19 NOTE — PROCEDURE: LIQUID NITROGEN
Render Post-Care Instructions In Note?: yes
Spray Paint Text: The liquid nitrogen was applied to the skin utilizing a spray paint frosting technique.
Render Note In Bullet Format When Appropriate: No
Number Of Freeze-Thaw Cycles: 2 freeze-thaw cycles
Duration Of Freeze Thaw-Cycle (Seconds): 5-10
Post-Care Instructions: I reviewed with the patient in detail post-care instructions. Patient is to wear sunprotection, and avoid picking at any of the treated lesions. Pt may apply Vaseline to crusted or scabbing areas.
Detail Level: Detailed
Consent: The patient's consent was obtained including but not limited to risks of crusting, scabbing, blistering, scarring, darker or lighter pigmentary change, recurrence, incomplete removal and infection.
Medical Necessity Clause: This procedure was medically necessary because the lesions that were treated were:
Medical Necessity Information: It is in your best interest to select a reason for this procedure from the list below. All of these items fulfill various CMS LCD requirements except the new and changing color options.

## 2023-03-14 ENCOUNTER — APPOINTMENT (OUTPATIENT)
Dept: URBAN - METROPOLITAN AREA CLINIC 206 | Age: 53
Setting detail: DERMATOLOGY
End: 2023-03-19

## 2023-03-14 DIAGNOSIS — D18.0 HEMANGIOMA: ICD-10-CM

## 2023-03-14 DIAGNOSIS — L57.8 OTHER SKIN CHANGES DUE TO CHRONIC EXPOSURE TO NONIONIZING RADIATION: ICD-10-CM

## 2023-03-14 DIAGNOSIS — L82.1 OTHER SEBORRHEIC KERATOSIS: ICD-10-CM

## 2023-03-14 DIAGNOSIS — L57.0 ACTINIC KERATOSIS: ICD-10-CM

## 2023-03-14 DIAGNOSIS — D22 MELANOCYTIC NEVI: ICD-10-CM

## 2023-03-14 DIAGNOSIS — Z87.2 PERSONAL HISTORY OF DISEASES OF THE SKIN AND SUBCUTANEOUS TISSUE: ICD-10-CM

## 2023-03-14 PROBLEM — D18.01 HEMANGIOMA OF SKIN AND SUBCUTANEOUS TISSUE: Status: ACTIVE | Noted: 2023-03-14

## 2023-03-14 PROBLEM — D22.9 MELANOCYTIC NEVI, UNSPECIFIED: Status: ACTIVE | Noted: 2023-03-14

## 2023-03-14 PROCEDURE — 99213 OFFICE O/P EST LOW 20 MIN: CPT | Mod: 25

## 2023-03-14 PROCEDURE — 17003 DESTRUCT PREMALG LES 2-14: CPT

## 2023-03-14 PROCEDURE — 17000 DESTRUCT PREMALG LESION: CPT

## 2023-03-14 PROCEDURE — OTHER COUNSELING: OTHER

## 2023-03-14 PROCEDURE — OTHER MIPS QUALITY: OTHER

## 2023-03-14 PROCEDURE — OTHER LIQUID NITROGEN: OTHER

## 2023-03-14 ASSESSMENT — LOCATION SIMPLE DESCRIPTION DERM
LOCATION SIMPLE: RIGHT LOWER BACK
LOCATION SIMPLE: CHEST
LOCATION SIMPLE: NOSE

## 2023-03-14 ASSESSMENT — LOCATION DETAILED DESCRIPTION DERM
LOCATION DETAILED: RIGHT SUPERIOR LATERAL MIDBACK
LOCATION DETAILED: LEFT LATERAL SUPERIOR CHEST
LOCATION DETAILED: NASAL ROOT
LOCATION DETAILED: UPPER STERNUM

## 2023-03-14 ASSESSMENT — LOCATION ZONE DERM
LOCATION ZONE: TRUNK
LOCATION ZONE: NOSE

## 2023-03-17 ENCOUNTER — APPOINTMENT (RX ONLY)
Dept: URBAN - METROPOLITAN AREA CLINIC 12 | Facility: CLINIC | Age: 53
Setting detail: DERMATOLOGY
End: 2023-03-17

## 2023-03-17 DIAGNOSIS — Z41.9 ENCOUNTER FOR PROCEDURE FOR PURPOSES OTHER THAN REMEDYING HEALTH STATE, UNSPECIFIED: ICD-10-CM

## 2023-03-17 PROCEDURE — ? XEOMIN

## 2023-03-17 NOTE — PROCEDURE: XEOMIN
Reconstitution Date: 03/17/2023
Detail Level: Detailed
Show Price In Note?: yes
Lot #: [id number]
Periorbital Skin Units: 30
Consent: Written consent was obtained prior to the procedure. Risks, benefits, expectations and alternatives were discussed including, but not limited to, infection, bleeding, lid/brow ptosis, bruising, swelling, diplopia, temporary effects, incomplete chemical denervation and dissatisfaction with the cosmetic outcome. No guarantee or warranty was given or implied regarding longevity of results.
Additional Area 1 Units: 0
Map Statement: See attached map for complete details
Price Per Unit In $ (Use Numbers Only, No Text Please.): 15
Postcare Instructions: Patient instructed to not lie down for 4 hours and limit physical activity for 24 hours. Patient instructed not to travel by airplane for 48 hours.
Expiration Date (Month Year): 12/24
Forehead Units: 10
Bill Summary Price Listed Below, Or Bill Total Of Units X Price Per Unit?: Bill #Units x Price Per Unit
Dilution (U/0.1 Cc): 5
Use Map Statement For Sites (Optional): No

## 2023-04-11 ENCOUNTER — APPOINTMENT (RX ONLY)
Dept: URBAN - METROPOLITAN AREA CLINIC 12 | Facility: CLINIC | Age: 53
Setting detail: DERMATOLOGY
End: 2023-04-11

## 2023-04-11 DIAGNOSIS — Z42.8 ENCOUNTER FOR OTHER PLASTIC AND RECONSTRUCTIVE SURGERY FOLLOWING MEDICAL PROCEDURE OR HEALED INJURY: ICD-10-CM

## 2023-04-11 PROCEDURE — ? XEOMIN

## 2023-05-15 ENCOUNTER — APPOINTMENT (OUTPATIENT)
Dept: URBAN - METROPOLITAN AREA CLINIC 206 | Age: 53
Setting detail: DERMATOLOGY
End: 2023-05-16

## 2023-05-15 DIAGNOSIS — L57.0 ACTINIC KERATOSIS: ICD-10-CM

## 2023-05-15 PROCEDURE — 17003 DESTRUCT PREMALG LES 2-14: CPT

## 2023-05-15 PROCEDURE — OTHER ADDITIONAL NOTES: OTHER

## 2023-05-15 PROCEDURE — 17000 DESTRUCT PREMALG LESION: CPT

## 2023-05-15 PROCEDURE — OTHER MIPS QUALITY: OTHER

## 2023-05-15 PROCEDURE — OTHER COUNSELING: OTHER

## 2023-05-15 PROCEDURE — OTHER LIQUID NITROGEN: OTHER

## 2023-05-15 ASSESSMENT — LOCATION SIMPLE DESCRIPTION DERM
LOCATION SIMPLE: CHIN
LOCATION SIMPLE: GLABELLA
LOCATION SIMPLE: NOSE
LOCATION SIMPLE: RIGHT FOREHEAD

## 2023-05-15 ASSESSMENT — LOCATION DETAILED DESCRIPTION DERM
LOCATION DETAILED: RIGHT INFERIOR MEDIAL FOREHEAD
LOCATION DETAILED: RIGHT MEDIAL FOREHEAD
LOCATION DETAILED: GLABELLA
LOCATION DETAILED: NASAL ROOT
LOCATION DETAILED: LEFT CHIN

## 2023-05-15 ASSESSMENT — LOCATION ZONE DERM
LOCATION ZONE: FACE
LOCATION ZONE: NOSE

## 2023-05-15 NOTE — PROCEDURE: ADDITIONAL NOTES
Detail Level: Simple
Additional Notes: Discussed treatment option of topical chemotherapy and reviewed AES/benefits of medication in detail\\nPatient deferred at this time\\nSunscreen SPF 30+ daily\\n2 month follow up if not resolved
Render Risk Assessment In Note?: no

## 2023-05-15 NOTE — PROCEDURE: LIQUID NITROGEN
Number Of Freeze-Thaw Cycles: 2 freeze-thaw cycles
Render Post-Care Instructions In Note?: yes
Detail Level: Detailed
Consent: The patient's consent was obtained including but not limited to risks of crusting, scabbing, blistering, scarring, darker or lighter pigmentary change, recurrence, incomplete removal and infection.
Application Tool (Optional): Liquid Nitrogen Sprayer
Render Note In Bullet Format When Appropriate: No
Duration Of Freeze Thaw-Cycle (Seconds): 5
Post-Care Instructions: I reviewed with the patient in detail post-care instructions. Patient is to wear sunprotection (spf 30+) and avoid picking at any of the treated lesions. Patient may apply Vaseline or Aquaphor to crusted or scabbing areas.

## 2023-05-15 NOTE — HPI: EVALUATION OF SKIN LESION(S)
Have Your Spot(S) Been Treated In The Past?: has not been treated
Hpi Title: Evaluation of Skin Lesions
Additional History: Patient unsure if lesion on nasal bridge was same one that was treated with LN2 in March.

## 2023-06-22 ENCOUNTER — APPOINTMENT (OUTPATIENT)
Dept: URBAN - METROPOLITAN AREA CLINIC 206 | Age: 53
Setting detail: DERMATOLOGY
End: 2023-06-24

## 2023-06-22 DIAGNOSIS — B07.8 OTHER VIRAL WARTS: ICD-10-CM

## 2023-06-22 PROCEDURE — OTHER MIPS QUALITY: OTHER

## 2023-06-22 PROCEDURE — 17110 DESTRUCT B9 LESION 1-14: CPT

## 2023-06-22 PROCEDURE — OTHER LIQUID NITROGEN: OTHER

## 2023-06-22 ASSESSMENT — LOCATION ZONE DERM: LOCATION ZONE: LEG

## 2023-06-22 ASSESSMENT — LOCATION SIMPLE DESCRIPTION DERM: LOCATION SIMPLE: LEFT POSTERIOR THIGH

## 2023-06-22 ASSESSMENT — LOCATION DETAILED DESCRIPTION DERM: LOCATION DETAILED: LEFT PROXIMAL POSTERIOR THIGH

## 2023-06-22 NOTE — HPI: WARTS (VERRUCA)
How Severe Are Your Warts?: moderate
Is This A New Presentation, Or A Follow-Up?: Warts
Additional History: Thinks she got them from her daughter.

## 2023-06-22 NOTE — PROCEDURE: LIQUID NITROGEN
Add 52 Modifier (Optional): no
Show Applicator Variable?: Yes
Medical Necessity Information: It is in your best interest to select a reason for this procedure from the list below. All of these items fulfill various CMS LCD requirements except the new and changing color options.
Medical Necessity Clause: This procedure was medically necessary because the lesions that were treated were:
Spray Paint Text: The liquid nitrogen was applied to the skin utilizing a spray paint frosting technique.
Post-Care Instructions: I reviewed with the patient in detail post-care instructions. Patient is to wear sunprotection, and avoid picking at any of the treated lesions. Pt may apply Vaseline to crusted or scabbing areas.
Duration Of Freeze Thaw-Cycle (Seconds): 5-10
Number Of Freeze-Thaw Cycles: 2 freeze-thaw cycles
Consent: The patient's consent was obtained including but not limited to risks of crusting, scabbing, blistering, scarring, darker or lighter pigmentary change, recurrence, incomplete removal and infection.
Detail Level: Detailed

## 2023-07-03 ENCOUNTER — APPOINTMENT (OUTPATIENT)
Dept: URBAN - METROPOLITAN AREA CLINIC 206 | Age: 53
Setting detail: DERMATOLOGY
End: 2023-07-05

## 2023-07-03 DIAGNOSIS — L259 CONTACT DERMATITIS AND OTHER ECZEMA, UNSPECIFIED CAUSE: ICD-10-CM

## 2023-07-03 DIAGNOSIS — L71.0 PERIORAL DERMATITIS: ICD-10-CM

## 2023-07-03 PROBLEM — L23.9 ALLERGIC CONTACT DERMATITIS, UNSPECIFIED CAUSE: Status: ACTIVE | Noted: 2023-07-03

## 2023-07-03 PROCEDURE — OTHER MIPS QUALITY: OTHER

## 2023-07-03 PROCEDURE — OTHER PRESCRIPTION: OTHER

## 2023-07-03 PROCEDURE — OTHER PRESCRIPTION MEDICATION MANAGEMENT: OTHER

## 2023-07-03 PROCEDURE — 99213 OFFICE O/P EST LOW 20 MIN: CPT

## 2023-07-03 PROCEDURE — OTHER COUNSELING: OTHER

## 2023-07-03 RX ORDER — CLOBETASOL PROPIONATE 0.5 MG/ML
SOLUTION TOPICAL
Qty: 50 | Refills: 0 | Status: ERX | COMMUNITY
Start: 2023-07-03

## 2023-07-03 RX ORDER — DOXYCYCLINE 100 MG/1
CAPSULE ORAL
Qty: 20 | Refills: 0 | Status: ERX | COMMUNITY
Start: 2023-07-03

## 2023-07-03 RX ORDER — CLINDAMYCIN PHOSPHATE 10 MG/ML
LOTION TOPICAL
Qty: 60 | Refills: 3 | Status: ERX | COMMUNITY
Start: 2023-07-03

## 2023-07-03 ASSESSMENT — LOCATION DETAILED DESCRIPTION DERM
LOCATION DETAILED: RIGHT INFERIOR CENTRAL MALAR CHEEK
LOCATION DETAILED: LEFT INFERIOR CENTRAL MALAR CHEEK

## 2023-07-03 ASSESSMENT — LOCATION ZONE DERM: LOCATION ZONE: FACE

## 2023-07-03 ASSESSMENT — LOCATION SIMPLE DESCRIPTION DERM
LOCATION SIMPLE: RIGHT CHEEK
LOCATION SIMPLE: LEFT CHEEK

## 2023-07-03 NOTE — PROCEDURE: PRESCRIPTION MEDICATION MANAGEMENT
Detail Level: Zone
Plan: Patient is going OOC today; advised to start Doxycycline 100mg twice daily for 7-10 days if not resolving. AEs reviewed.\\nClindamycin topical for use on face daily
Render In Strict Bullet Format?: No

## 2023-07-03 NOTE — PROCEDURE: COUNSELING
Detail Level: Detailed Add Associated Diagnoses If Applicable When Selecting Medical Necessity: Yes Anesthesia Volume In Cc: 3 Detail Level: Detailed Medical Necessity Clause: This procedure was medically necessary because the lesions that were treated were: Medical Necessity Information: It is in your best interest to select a reason for this procedure from the list below. All of these items fulfill various CMS LCD requirements except the new and changing color options. Consent: Written consent obtained and the risks of skin tag removal was reviewed with the patient including but not limited to bleeding, pigmentary change, infection, pain, and remote possibility of scarring. Anesthesia Type: 1% lidocaine with epinephrine Include Z78.9 (Other Specified Conditions Influencing Health Status) As An Associated Diagnosis?: No

## 2023-08-03 ENCOUNTER — APPOINTMENT (OUTPATIENT)
Dept: URBAN - METROPOLITAN AREA CLINIC 206 | Age: 53
Setting detail: DERMATOLOGY
End: 2023-08-05

## 2023-08-03 DIAGNOSIS — B36.8 OTHER SPECIFIED SUPERFICIAL MYCOSES: ICD-10-CM

## 2023-08-03 PROCEDURE — OTHER PRESCRIPTION: OTHER

## 2023-08-03 PROCEDURE — OTHER MIPS QUALITY: OTHER

## 2023-08-03 PROCEDURE — OTHER COUNSELING: OTHER

## 2023-08-03 PROCEDURE — 99214 OFFICE O/P EST MOD 30 MIN: CPT

## 2023-08-03 PROCEDURE — OTHER PRESCRIPTION MEDICATION MANAGEMENT: OTHER

## 2023-08-03 RX ORDER — NYSTATIN AND TRIAMCINOLONE ACETONIDE 100000; 1 [USP'U]/G; MG/G
CREAM TOPICAL
Qty: 60 | Refills: 3 | Status: ERX | COMMUNITY
Start: 2023-08-03

## 2023-08-03 RX ORDER — KETOCONAZOLE 20 MG/ML
SHAMPOO, SUSPENSION TOPICAL QD
Qty: 120 | Refills: 2 | Status: ERX | COMMUNITY
Start: 2023-08-03

## 2023-08-03 RX ORDER — FLUCONAZOLE 200 MG/1
TABLET ORAL
Qty: 8 | Refills: 0 | Status: ERX | COMMUNITY
Start: 2023-08-03

## 2023-08-03 ASSESSMENT — LOCATION DETAILED DESCRIPTION DERM
LOCATION DETAILED: LEFT INFERIOR POSTERIOR NECK
LOCATION DETAILED: MID TRAPEZIAL NECK
LOCATION DETAILED: LEFT SUPERIOR UPPER BACK
LOCATION DETAILED: RIGHT SUPERIOR LATERAL NECK
LOCATION DETAILED: RIGHT SUPERIOR UPPER BACK
LOCATION DETAILED: LEFT SUPERIOR POSTERIOR NECK
LOCATION DETAILED: RIGHT POSTERIOR NECK
LOCATION DETAILED: LEFT POSTERIOR NECK

## 2023-08-03 ASSESSMENT — LOCATION SIMPLE DESCRIPTION DERM
LOCATION SIMPLE: TRAPEZIAL NECK
LOCATION SIMPLE: POSTERIOR NECK
LOCATION SIMPLE: RIGHT UPPER BACK
LOCATION SIMPLE: LEFT UPPER BACK

## 2023-08-03 ASSESSMENT — LOCATION ZONE DERM
LOCATION ZONE: TRUNK
LOCATION ZONE: NECK

## 2023-08-03 NOTE — HPI: RASH
How Severe Is Your Rash?: moderate
Is This A New Presentation, Or A Follow-Up?: Rash
Additional History: Pt states she used Clobetasol scalp as this was given to her last appt for ACD but believes this rash is different as Clobetasol has helped. States very itchy. Pt takes Zrytec every for regular allergies. Pt was on vacation and about 10 days later rash came up.

## 2023-08-03 NOTE — PROCEDURE: PRESCRIPTION MEDICATION MANAGEMENT
Detail Level: Zone
Initiate Treatment: -Diflucan (Fluconazole) 200mg take 2 pills once a week for 4 weeks. Do not drink alcohol on days you take this medication \\n-Nystatin/Triamcinolone cream apply to back of neck and behind ears twice daily for 2 weeks on 2 weeks off 2 weeks on 2 weeks off etc until resolved \\n-Ketoconazole shampoo lather on scalp and back of neck let sit for 3-4 mins then rinse every day until clear\\n-take 1 pill of Allegra in the morning and 1 pill of Zrytec at night time
Render In Strict Bullet Format?: Yes

## 2023-08-18 ENCOUNTER — RX ONLY (RX ONLY)
Age: 53
End: 2023-08-18

## 2023-08-18 RX ORDER — KETOCONAZOLE 20 MG/G
CREAM TOPICAL
Qty: 60 | Refills: 3 | Status: ERX | COMMUNITY
Start: 2023-08-18

## 2023-08-28 ENCOUNTER — OFFICE VISIT (OUTPATIENT)
Dept: URBAN - METROPOLITAN AREA SURGERY CENTER 18 | Facility: SURGERY CENTER | Age: 53
End: 2023-08-28
Payer: COMMERCIAL

## 2023-08-28 DIAGNOSIS — Z86.010 ADENOMAS PERSONAL HISTORY OF COLONIC POLYPS: ICD-10-CM

## 2023-08-28 PROCEDURE — G0105 COLORECTAL SCRN; HI RISK IND: HCPCS | Performed by: INTERNAL MEDICINE

## 2023-08-28 PROCEDURE — G8907 PT DOC NO EVENTS ON DISCHARG: HCPCS | Performed by: INTERNAL MEDICINE

## 2023-09-28 ENCOUNTER — APPOINTMENT (OUTPATIENT)
Dept: URBAN - METROPOLITAN AREA CLINIC 206 | Age: 53
Setting detail: DERMATOLOGY
End: 2023-09-28

## 2023-09-28 DIAGNOSIS — L57.0 ACTINIC KERATOSIS: ICD-10-CM

## 2023-09-28 DIAGNOSIS — B07.8 OTHER VIRAL WARTS: ICD-10-CM

## 2023-09-28 PROCEDURE — OTHER COUNSELING: OTHER

## 2023-09-28 PROCEDURE — 17003 DESTRUCT PREMALG LES 2-14: CPT | Mod: 59

## 2023-09-28 PROCEDURE — 17110 DESTRUCT B9 LESION 1-14: CPT

## 2023-09-28 PROCEDURE — 17000 DESTRUCT PREMALG LESION: CPT | Mod: 59

## 2023-09-28 PROCEDURE — OTHER LIQUID NITROGEN: OTHER

## 2023-09-28 PROCEDURE — OTHER MIPS QUALITY: OTHER

## 2023-09-28 ASSESSMENT — LOCATION ZONE DERM
LOCATION ZONE: LEG
LOCATION ZONE: NOSE
LOCATION ZONE: FACE

## 2023-09-28 ASSESSMENT — LOCATION DETAILED DESCRIPTION DERM
LOCATION DETAILED: LEFT PROXIMAL PRETIBIAL REGION
LOCATION DETAILED: LEFT CENTRAL EYEBROW
LOCATION DETAILED: LEFT NASAL SIDEWALL

## 2023-09-28 ASSESSMENT — LOCATION SIMPLE DESCRIPTION DERM
LOCATION SIMPLE: LEFT EYEBROW
LOCATION SIMPLE: LEFT PRETIBIAL REGION
LOCATION SIMPLE: LEFT NOSE

## 2023-09-28 ASSESSMENT — PAIN INTENSITY VAS: HOW INTENSE IS YOUR PAIN 0 BEING NO PAIN, 10 BEING THE MOST SEVERE PAIN POSSIBLE?: NO PAIN

## 2023-09-28 NOTE — PROCEDURE: LIQUID NITROGEN
Duration Of Freeze Thaw-Cycle (Seconds): 5
Post-Care Instructions: I reviewed with the patient in detail post-care instructions. Patient is to wear sunprotection (spf 30+) and avoid picking at any of the treated lesions. Patient may apply Vaseline or Aquaphor to crusted or scabbing areas.
Number Of Freeze-Thaw Cycles: 2 freeze-thaw cycles
Render Post-Care Instructions In Note?: yes
Detail Level: Detailed
Consent: The patient's consent was obtained including but not limited to risks of crusting, scabbing, blistering, scarring, darker or lighter pigmentary change, recurrence, incomplete removal and infection.
Render Note In Bullet Format When Appropriate: No
Application Tool (Optional): Liquid Nitrogen Sprayer
Medical Necessity Information: It is in your best interest to select a reason for this procedure from the list below. All of these items fulfill various CMS LCD requirements except the new and changing color options.
Medical Necessity Clause: This procedure was medically necessary because the lesions that were treated were:
Post-Care Instructions: I reviewed with the patient in detail post-care instructions. Patient is to wear sunprotection, and avoid picking at any of the treated lesions. Pt may apply Vaseline to crusted or scabbing areas.
Duration Of Freeze Thaw-Cycle (Seconds): 5-10
Spray Paint Text: The liquid nitrogen was applied to the skin utilizing a spray paint frosting technique.

## 2023-12-04 ENCOUNTER — APPOINTMENT (OUTPATIENT)
Dept: URBAN - METROPOLITAN AREA CLINIC 206 | Age: 53
Setting detail: DERMATOLOGY
End: 2023-12-09

## 2023-12-04 DIAGNOSIS — B07.8 OTHER VIRAL WARTS: ICD-10-CM

## 2023-12-04 DIAGNOSIS — L57.0 ACTINIC KERATOSIS: ICD-10-CM

## 2023-12-04 PROCEDURE — 99213 OFFICE O/P EST LOW 20 MIN: CPT

## 2023-12-04 PROCEDURE — OTHER MIPS QUALITY: OTHER

## 2023-12-04 PROCEDURE — OTHER COUNSELING: OTHER

## 2024-01-05 ENCOUNTER — OFFICE VISIT (OUTPATIENT)
Dept: URBAN - METROPOLITAN AREA CLINIC 98 | Facility: CLINIC | Age: 54
End: 2024-01-05

## 2024-01-18 ENCOUNTER — APPOINTMENT (OUTPATIENT)
Dept: URBAN - METROPOLITAN AREA CLINIC 206 | Age: 54
Setting detail: DERMATOLOGY
End: 2024-01-20

## 2024-01-18 DIAGNOSIS — L57.0 ACTINIC KERATOSIS: ICD-10-CM

## 2024-01-18 PROCEDURE — OTHER ADDITIONAL NOTES: OTHER

## 2024-01-18 PROCEDURE — 17003 DESTRUCT PREMALG LES 2-14: CPT

## 2024-01-18 PROCEDURE — OTHER MIPS QUALITY: OTHER

## 2024-01-18 PROCEDURE — OTHER COUNSELING: OTHER

## 2024-01-18 PROCEDURE — OTHER LIQUID NITROGEN: OTHER

## 2024-01-18 PROCEDURE — 17000 DESTRUCT PREMALG LESION: CPT

## 2024-01-18 ASSESSMENT — LOCATION ZONE DERM
LOCATION ZONE: FACE
LOCATION ZONE: NOSE

## 2024-01-18 ASSESSMENT — LOCATION SIMPLE DESCRIPTION DERM
LOCATION SIMPLE: RIGHT NOSE
LOCATION SIMPLE: LEFT FOREHEAD

## 2024-01-18 ASSESSMENT — LOCATION DETAILED DESCRIPTION DERM
LOCATION DETAILED: RIGHT NASAL SIDEWALL
LOCATION DETAILED: LEFT INFERIOR FOREHEAD

## 2024-01-18 NOTE — PROCEDURE: LIQUID NITROGEN
Show Aperture Variable?: Yes
Duration Of Freeze Thaw-Cycle (Seconds): 5
Detail Level: Detailed
Number Of Freeze-Thaw Cycles: 2 freeze-thaw cycles
Render Note In Bullet Format When Appropriate: No
Consent: The patient's consent was obtained including but not limited to risks of crusting, scabbing, blistering, scarring, darker or lighter pigmentary change, recurrence, incomplete removal and infection.
Post-Care Instructions: I reviewed with the patient in detail post-care instructions. Patient is to wear sunprotection (spf 30+) and avoid picking at any of the treated lesions. Patient may apply Vaseline or Aquaphor to crusted or scabbing areas.
Application Tool (Optional): Liquid Nitrogen Sprayer

## 2024-01-18 NOTE — PROCEDURE: ADDITIONAL NOTES
Additional Notes: Discussed field therapy options as a way of proactively treating lesions. Discussed topical Klysiri vs 5FU vs PDT. Samples of Klysiri given to apply to nose and above brows QD x 5 days. Expect possible blistering, pain, redness with use. Aquaphor to areas if reaction occurs. Do not apply until today's treated lesions are healed or until after pt has healed from IPL and/or chemical peel she is having with  this weekend (approx 4 weeks).
Detail Level: Simple
Render Risk Assessment In Note?: no

## 2024-03-18 ENCOUNTER — APPOINTMENT (OUTPATIENT)
Dept: URBAN - METROPOLITAN AREA CLINIC 206 | Age: 54
Setting detail: DERMATOLOGY
End: 2024-03-25

## 2024-03-18 DIAGNOSIS — L57.0 ACTINIC KERATOSIS: ICD-10-CM

## 2024-03-18 DIAGNOSIS — L57.8 OTHER SKIN CHANGES DUE TO CHRONIC EXPOSURE TO NONIONIZING RADIATION: ICD-10-CM

## 2024-03-18 DIAGNOSIS — D18.0 HEMANGIOMA: ICD-10-CM

## 2024-03-18 DIAGNOSIS — L82.1 OTHER SEBORRHEIC KERATOSIS: ICD-10-CM

## 2024-03-18 DIAGNOSIS — D22 MELANOCYTIC NEVI: ICD-10-CM

## 2024-03-18 PROBLEM — D18.01 HEMANGIOMA OF SKIN AND SUBCUTANEOUS TISSUE: Status: ACTIVE | Noted: 2024-03-18

## 2024-03-18 PROBLEM — D22.9 MELANOCYTIC NEVI, UNSPECIFIED: Status: ACTIVE | Noted: 2024-03-18

## 2024-03-18 PROCEDURE — 99213 OFFICE O/P EST LOW 20 MIN: CPT | Mod: 25

## 2024-03-18 PROCEDURE — 17000 DESTRUCT PREMALG LESION: CPT

## 2024-03-18 PROCEDURE — 17003 DESTRUCT PREMALG LES 2-14: CPT

## 2024-03-18 PROCEDURE — OTHER MIPS QUALITY: OTHER

## 2024-03-18 PROCEDURE — OTHER COUNSELING: OTHER

## 2024-03-18 PROCEDURE — OTHER LIQUID NITROGEN: OTHER

## 2024-03-18 ASSESSMENT — LOCATION DETAILED DESCRIPTION DERM
LOCATION DETAILED: RIGHT SUPERIOR LATERAL MIDBACK
LOCATION DETAILED: LEFT LATERAL SUPERIOR CHEST
LOCATION DETAILED: UPPER STERNUM
LOCATION DETAILED: LEFT INFERIOR MEDIAL FOREHEAD
LOCATION DETAILED: NASAL DORSUM

## 2024-03-18 ASSESSMENT — LOCATION ZONE DERM
LOCATION ZONE: TRUNK
LOCATION ZONE: NOSE
LOCATION ZONE: FACE

## 2024-03-18 ASSESSMENT — LOCATION SIMPLE DESCRIPTION DERM
LOCATION SIMPLE: LEFT FOREHEAD
LOCATION SIMPLE: NOSE
LOCATION SIMPLE: RIGHT LOWER BACK
LOCATION SIMPLE: CHEST

## 2024-03-18 NOTE — PROCEDURE: LIQUID NITROGEN
Render Note In Bullet Format When Appropriate: No
Detail Level: Detailed
Render Post-Care Instructions In Note?: yes
Post-Care Instructions: I reviewed with the patient in detail post-care instructions. Patient is to wear sunprotection, and avoid picking at any of the treated lesions. Pt may apply Vaseline to crusted or scabbing areas.
Number Of Freeze-Thaw Cycles: 2 freeze-thaw cycles
Duration Of Freeze Thaw-Cycle (Seconds): 0
Consent: The patient's consent was obtained including but not limited to risks of crusting, scabbing, blistering, scarring, darker or lighter pigmentary change, recurrence, incomplete removal and infection.

## 2024-04-11 ENCOUNTER — APPOINTMENT (OUTPATIENT)
Dept: URBAN - METROPOLITAN AREA CLINIC 206 | Age: 54
Setting detail: DERMATOLOGY
End: 2024-04-15

## 2024-04-11 DIAGNOSIS — L30.9 DERMATITIS, UNSPECIFIED: ICD-10-CM

## 2024-04-11 DIAGNOSIS — L57.0 ACTINIC KERATOSIS: ICD-10-CM

## 2024-04-11 PROCEDURE — 17000 DESTRUCT PREMALG LESION: CPT

## 2024-04-11 PROCEDURE — OTHER PRESCRIPTION MEDICATION MANAGEMENT: OTHER

## 2024-04-11 PROCEDURE — OTHER MEDICATION COUNSELING: OTHER

## 2024-04-11 PROCEDURE — OTHER LIQUID NITROGEN: OTHER

## 2024-04-11 PROCEDURE — 99212 OFFICE O/P EST SF 10 MIN: CPT | Mod: 25

## 2024-04-11 PROCEDURE — OTHER PRESCRIPTION: OTHER

## 2024-04-11 PROCEDURE — OTHER MIPS QUALITY: OTHER

## 2024-04-11 PROCEDURE — 17003 DESTRUCT PREMALG LES 2-14: CPT

## 2024-04-11 PROCEDURE — OTHER COUNSELING: OTHER

## 2024-04-11 RX ORDER — DOXYCYCLINE HYCLATE 100 MG/1
CAPSULE, GELATIN COATED ORAL
Qty: 14 | Refills: 0 | Status: ERX | COMMUNITY
Start: 2024-04-11

## 2024-04-11 RX ORDER — MUPIROCIN 20 MG/G
OINTMENT TOPICAL BID
Qty: 15 | Refills: 0 | Status: ERX | COMMUNITY
Start: 2024-04-11

## 2024-04-11 ASSESSMENT — LOCATION DETAILED DESCRIPTION DERM
LOCATION DETAILED: SUPERIOR MID FOREHEAD
LOCATION DETAILED: RIGHT INFERIOR LATERAL FOREHEAD
LOCATION DETAILED: LEFT INFERIOR FOREHEAD

## 2024-04-11 ASSESSMENT — LOCATION SIMPLE DESCRIPTION DERM
LOCATION SIMPLE: RIGHT FOREHEAD
LOCATION SIMPLE: LEFT FOREHEAD
LOCATION SIMPLE: SUPERIOR FOREHEAD

## 2024-04-11 ASSESSMENT — LOCATION ZONE DERM: LOCATION ZONE: FACE

## 2024-04-11 NOTE — HPI: RASH
Is The Patient Presenting As Previously Scheduled?: Yes
How Severe Is Your Rash?: moderate
Is This A New Presentation, Or A Follow-Up?: Rash
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

## 2024-04-11 NOTE — PROCEDURE: PRESCRIPTION MEDICATION MANAGEMENT
Detail Level: Detailed
Render In Strict Bullet Format?: No
Initiate Treatment: Take 1 capsule of Doxycycline 100mg twice a day for 7 days with food and water (SENT TO LOCAL PHARMACY).\\n\\nApply a thin layer of Mupirocin 1% Ointment to the affected area on the left index finger twice a day (SENT TO LOCAL PHARMACY).

## 2024-04-11 NOTE — PROCEDURE: LIQUID NITROGEN
Duration Of Freeze Thaw-Cycle (Seconds): 5
Render Post-Care Instructions In Note?: yes
Post-Care Instructions: I reviewed with the patient in detail post-care instructions. Patient is to wear sunprotection, and avoid picking at any of the treated lesions. Pt may apply Vaseline to crusted or scabbing areas.
Show Aperture Variable?: No
Consent: The patient's consent was obtained including but not limited to risks of crusting, scabbing, blistering, scarring, darker or lighter pigmentary change, recurrence, incomplete removal and infection.
Number Of Freeze-Thaw Cycles: 1 freeze-thaw cycle
Detail Level: Detailed

## 2024-04-11 NOTE — PROCEDURE: MEDICATION COUNSELING
Topical Steroids Applications Pregnancy And Lactation Text: Most topical steroids are considered safe to use during pregnancy and lactation.  Any topical steroid applied to the breast or nipple should be washed off before breastfeeding.
Minoxidil Pregnancy And Lactation Text: This medication has not been assigned a Pregnancy Risk Category but animal studies failed to show danger with the topical medication. It is unknown if the medication is excreted in breast milk.
Taltz Counseling: I discussed with the patient the risks of ixekizumab including but not limited to immunosuppression, serious infections, worsening of inflammatory bowel disease and drug reactions.  The patient understands that monitoring is required including a PPD at baseline and must alert us or the primary physician if symptoms of infection or other concerning signs are noted.
Thalidomide Pregnancy And Lactation Text: This medication is Pregnancy Category X and is absolutely contraindicated during pregnancy. It is unknown if it is excreted in breast milk.
Griseofulvin Pregnancy And Lactation Text: This medication is Pregnancy Category X and is known to cause serious birth defects. It is unknown if this medication is excreted in breast milk but breast feeding should be avoided.
Carac Counseling:  I discussed with the patient the risks of Carac including but not limited to erythema, scaling, itching, weeping, crusting, and pain.
Litfulo Counseling: I discussed with the patient the risks of Litfulo therapy including but not limited to upper respiratory tract infections, shingles, cold sores, and nausea. Live vaccines should be avoided.  This medication has been linked to serious infections; higher rate of mortality; malignancy and lymphoproliferative disorders; major adverse cardiovascular events; thrombosis; gastrointestinal perforations; neutropenia; lymphopenia; anemia; liver enzyme elevations; and lipid elevations.
Dupixent Pregnancy And Lactation Text: This medication likely crosses the placenta but the risk for the fetus is uncertain. This medication is excreted in breast milk.
Otezla Counseling: The side effects of Otezla were discussed with the patient, including but not limited to worsening or new depression, weight loss, diarrhea, nausea, upper respiratory tract infection, and headache. Patient instructed to call the office should any adverse effect occur.  The patient verbalized understanding of the proper use and possible adverse effects of Otezla.  All the patient's questions and concerns were addressed.
Cyclophosphamide Pregnancy And Lactation Text: This medication is Pregnancy Category D and it isn't considered safe during pregnancy. This medication is excreted in breast milk.
Rifampin Counseling: I discussed with the patient the risks of rifampin including but not limited to liver damage, kidney damage, red-orange body fluids, nausea/vomiting and severe allergy.
Birth Control Pills Counseling: Birth Control Pill Counseling: I discussed with the patient the potential side effects of OCPs including but not limited to increased risk of stroke, heart attack, thrombophlebitis, deep venous thrombosis, hepatic adenomas, breast changes, GI upset, headaches, and depression.  The patient verbalized understanding of the proper use and possible adverse effects of OCPs. All of the patient's questions and concerns were addressed.
Qbrexza Counseling:  I discussed with the patient the risks of Qbrexza including but not limited to headache, mydriasis, blurred vision, dry eyes, nasal dryness, dry mouth, dry throat, dry skin, urinary hesitation, and constipation.  Local skin reactions including erythema, burning, stinging, and itching can also occur.
Rituxan Pregnancy And Lactation Text: This medication is Pregnancy Category C and it isn't know if it is safe during pregnancy. It is unknown if this medication is excreted in breast milk but similar antibodies are known to be excreted.
Libtayo Pregnancy And Lactation Text: This medication is contraindicated in pregnancy and when breast feeding.
Adbry Counseling: I discussed with the patient the risks of tralokinumab including but not limited to eye infection and irritation, cold sores, injection site reactions, worsening of asthma, allergic reactions and increased risk of parasitic infection.  Live vaccines should be avoided while taking tralokinumab. The patient understands that monitoring is required and they must alert us or the primary physician if symptoms of infection or other concerning signs are noted.
Colchicine Pregnancy And Lactation Text: This medication is Pregnancy Category C and isn't considered safe during pregnancy. It is excreted in breast milk.
Zyclara Pregnancy And Lactation Text: This medication is Pregnancy Category C. It is unknown if this medication is excreted in breast milk.
Enbrel Counseling:  I discussed with the patient the risks of etanercept including but not limited to myelosuppression, immunosuppression, autoimmune hepatitis, demyelinating diseases, lymphoma, and infections.  The patient understands that monitoring is required including a PPD at baseline and must alert us or the primary physician if symptoms of infection or other concerning signs are noted.
High Dose Vitamin A Counseling: Side effects reviewed, pt to contact office should one occur.
Doxepin Counseling:  Patient advised that the medication is sedating and not to drive a car after taking this medication. Patient informed of potential adverse effects including but not limited to dry mouth, urinary retention, and blurry vision.  The patient verbalized understanding of the proper use and possible adverse effects of doxepin.  All of the patient's questions and concerns were addressed.
Azithromycin Counseling:  I discussed with the patient the risks of azithromycin including but not limited to GI upset, allergic reaction, drug rash, diarrhea, and yeast infections.
Tranexamic Acid Counseling:  Patient advised of the small risk of bleeding problems with tranexamic acid. They were also instructed to call if they developed any nausea, vomiting or diarrhea. All of the patient's questions and concerns were addressed.
Tazorac Counseling:  Patient advised that medication is irritating and drying.  Patient may need to apply sparingly and wash off after an hour before eventually leaving it on overnight.  The patient verbalized understanding of the proper use and possible adverse effects of tazorac.  All of the patient's questions and concerns were addressed.
Taltz Pregnancy And Lactation Text: The risk during pregnancy and breastfeeding is uncertain with this medication.
Wartpeel Counseling:  I discussed with the patient the risks of Wartpeel including but not limited to erythema, scaling, itching, weeping, crusting, and pain.
Carac Pregnancy And Lactation Text: This medication is Pregnancy Category X and contraindicated in pregnancy and in women who may become pregnant. It is unknown if this medication is excreted in breast milk.
Eucrisa Counseling: Patient may experience a mild burning sensation during topical application. Eucrisa is not approved in children less than 3 months of age.
Low Dose Naltrexone Pregnancy And Lactation Text: Naltrexone is pregnancy category C.  There have been no adequate and well-controlled studies in pregnant women.  It should be used in pregnancy only if the potential benefit justifies the potential risk to the fetus.   Limited data indicates that naltrexone is minimally excreted into breastmilk.
Doxycycline Pregnancy And Lactation Text: This medication is Pregnancy Category D and not consider safe during pregnancy. It is also excreted in breast milk but is considered safe for shorter treatment courses.
Rifampin Pregnancy And Lactation Text: This medication is Pregnancy Category C and it isn't know if it is safe during pregnancy. It is also excreted in breast milk and should not be used if you are breast feeding.
Siliq Counseling:  I discussed with the patient the risks of Siliq including but not limited to new or worsening depression, suicidal thoughts and behavior, immunosuppression, malignancy, posterior leukoencephalopathy syndrome, and serious infections.  The patient understands that monitoring is required including a PPD at baseline and must alert us or the primary physician if symptoms of infection or other concerning signs are noted. There is also a special program designed to monitor depression which is required with Siliq.
Otezla Pregnancy And Lactation Text: This medication is Pregnancy Category C and it isn't known if it is safe during pregnancy. It is unknown if it is excreted in breast milk.
Itraconazole Counseling:  I discussed with the patient the risks of itraconazole including but not limited to liver damage, nausea/vomiting, neuropathy, and severe allergy.  The patient understands that this medication is best absorbed when taken with acidic beverages such as non-diet cola or ginger ale.  The patient understands that monitoring is required including baseline LFTs and repeat LFTs at intervals.  The patient understands that they are to contact us or the primary physician if concerning signs are noted.
Cyclosporine Counseling:  I discussed with the patient the risks of cyclosporine including but not limited to hypertension, gingival hyperplasia,myelosuppression, immunosuppression, liver damage, kidney damage, neurotoxicity, lymphoma, and serious infections. The patient understands that monitoring is required including baseline blood pressure, CBC, CMP, lipid panel and uric acid, and then 1-2 times monthly CMP and blood pressure.
Doxepin Pregnancy And Lactation Text: This medication is Pregnancy Category C and it isn't known if it is safe during pregnancy. It is also excreted in breast milk and breast feeding isn't recommended.
Birth Control Pills Pregnancy And Lactation Text: This medication should be avoided if pregnant and for the first 30 days post-partum.
Dapsone Counseling: I discussed with the patient the risks of dapsone including but not limited to hemolytic anemia, agranulocytosis, rashes, methemoglobinemia, kidney failure, peripheral neuropathy, headaches, GI upset, and liver toxicity.  Patients who start dapsone require monitoring including baseline LFTs and weekly CBCs for the first month, then every month thereafter.  The patient verbalized understanding of the proper use and possible adverse effects of dapsone.  All of the patient's questions and concerns were addressed.
Odomzo Counseling- I discussed with the patient the risks of Odomzo including but not limited to nausea, vomiting, diarrhea, constipation, weight loss, changes in the sense of taste, decreased appetite, muscle spasms, and hair loss.  The patient verbalized understanding of the proper use and possible adverse effects of Odomzo.  All of the patient's questions and concerns were addressed.
Tazorac Pregnancy And Lactation Text: This medication is not safe during pregnancy. It is unknown if this medication is excreted in breast milk.
Qbrexza Pregnancy And Lactation Text: There is no available data on Qbrexza use in pregnant women.  There is no available data on Qbrexza use in lactation.
High Dose Vitamin A Pregnancy And Lactation Text: High dose vitamin A therapy is contraindicated during pregnancy and breast feeding.
Mirvaso Counseling: Mirvaso is a topical medication which can decrease superficial blood flow where applied. Side effects are uncommon and include stinging, redness and allergic reactions.
Litfulo Pregnancy And Lactation Text: Based on animal studies, Lifulo may cause embryo-fetal harm when administered to pregnant women.  The medication should not be used in pregnancy.  Breastfeeding is not recommended during treatment.
Adbry Pregnancy And Lactation Text: It is unknown if this medication will adversely affect pregnancy or breast feeding.
Tranexamic Acid Pregnancy And Lactation Text: It is unknown if this medication is safe during pregnancy or breast feeding.
Rhofade Counseling: Rhofade is a topical medication which can decrease superficial blood flow where applied. Side effects are uncommon and include stinging, redness and allergic reactions.
Calcipotriene Counseling:  I discussed with the patient the risks of calcipotriene including but not limited to erythema, scaling, itching, and irritation.
Tremfya Counseling: I discussed with the patient the risks of guselkumab including but not limited to immunosuppression, serious infections, and drug reactions.  The patient understands that monitoring is required including a PPD at baseline and must alert us or the primary physician if symptoms of infection or other concerning signs are noted.
Niacinamide Counseling: I recommended taking niacin or niacinamide, also know as vitamin B3, twice daily. Recent evidence suggests that taking vitamin B3 (500 mg twice daily) can reduce the risk of actinic keratoses and non-melanoma skin cancers. Side effects of vitamin B3 include flushing and headache.
Humira Pregnancy And Lactation Text: This medication is Pregnancy Category B and is considered safe during pregnancy. It is unknown if this medication is excreted in breast milk.
Erythromycin Counseling:  I discussed with the patient the risks of erythromycin including but not limited to GI upset, allergic reaction, drug rash, diarrhea, increase in liver enzymes, and yeast infections.
Spironolactone Counseling: Patient advised regarding risks of diarrhea, abdominal pain, hyperkalemia, birth defects (for female patients), liver toxicity and renal toxicity. The patient may need blood work to monitor liver and kidney function and potassium levels while on therapy. The patient verbalized understanding of the proper use and possible adverse effects of spironolactone.  All of the patient's questions and concerns were addressed.
Azithromycin Pregnancy And Lactation Text: This medication is considered safe during pregnancy and is also secreted in breast milk.
Dapsone Pregnancy And Lactation Text: This medication is Pregnancy Category C and is not considered safe during pregnancy or breast feeding.
Opioid Counseling: I discussed with the patient the potential side effects of opioids including but not limited to addiction, altered mental status, and depression. I stressed avoiding alcohol, benzodiazepines, muscle relaxants and sleep aids unless specifically okayed by a physician. The patient verbalized understanding of the proper use and possible adverse effects of opioids. All of the patient's questions and concerns were addressed. They were instructed to flush the remaining pills down the toilet if they did not need them for pain.
Topical Clindamycin Counseling: Patient counseled that this medication may cause skin irritation or allergic reactions.  In the event of skin irritation, the patient was advised to reduce the amount of the drug applied or use it less frequently.   The patient verbalized understanding of the proper use and possible adverse effects of clindamycin.  All of the patient's questions and concerns were addressed.
Xeltaylorz Pregnancy And Lactation Text: This medication is Pregnancy Category D and is not considered safe during pregnancy.  The risk during breast feeding is also uncertain.
Hydroquinone Counseling:  Patient advised that medication may result in skin irritation, lightening (hypopigmentation), dryness, and burning.  In the event of skin irritation, the patient was advised to reduce the amount of the drug applied or use it less frequently.  Rarely, spots that are treated with hydroquinone can become darker (pseudoochronosis).  Should this occur, patient instructed to stop medication and call the office. The patient verbalized understanding of the proper use and possible adverse effects of hydroquinone.  All of the patient's questions and concerns were addressed.
Olumiant Counseling: I discussed with the patient the risks of Olumiant therapy including but not limited to upper respiratory tract infections, shingles, cold sores, and nausea. Live vaccines should be avoided.  This medication has been linked to serious infections; higher rate of mortality; malignancy and lymphoproliferative disorders; major adverse cardiovascular events; thrombosis; gastrointestinal perforations; neutropenia; lymphopenia; anemia; liver enzyme elevations; and lipid elevations.
Calcipotriene Pregnancy And Lactation Text: The use of this medication during pregnancy or lactation is not recommended as there is insufficient data.
Cyclosporine Pregnancy And Lactation Text: This medication is Pregnancy Category C and it isn't know if it is safe during pregnancy. This medication is excreted in breast milk.
Oxybutynin Counseling:  I discussed with the patient the risks of oxybutynin including but not limited to skin rash, drowsiness, dry mouth, difficulty urinating, and blurred vision.
Bimzelx Counseling:  I discussed with the patient the risks of Bimzelx including but not limited to depression, immunosuppression, allergic reactions and infections.  The patient understands that monitoring is required including a PPD at baseline and must alert us or the primary physician if symptoms of infection or other concerning signs are noted.
Erythromycin Pregnancy And Lactation Text: This medication is Pregnancy Category B and is considered safe during pregnancy. It is also excreted in breast milk.
Sarecycline Counseling: Patient advised regarding possible photosensitivity and discoloration of the teeth, skin, lips, tongue and gums.  Patient instructed to avoid sunlight, if possible.  When exposed to sunlight, patients should wear protective clothing, sunglasses, and sunscreen.  The patient was instructed to call the office immediately if the following severe adverse effects occur:  hearing changes, easy bruising/bleeding, severe headache, or vision changes.  The patient verbalized understanding of the proper use and possible adverse effects of sarecycline.  All of the patient's questions and concerns were addressed.
Niacinamide Pregnancy And Lactation Text: These medications are considered safe during pregnancy.
Itraconazole Pregnancy And Lactation Text: This medication is Pregnancy Category C and it isn't know if it is safe during pregnancy. It is also excreted in breast milk.
Mirvaso Pregnancy And Lactation Text: This medication has not been assigned a Pregnancy Risk Category. It is unknown if the medication is excreted in breast milk.
Hydroxyzine Counseling: Patient advised that the medication is sedating and not to drive a car after taking this medication.  Patient informed of potential adverse effects including but not limited to dry mouth, urinary retention, and blurry vision.  The patient verbalized understanding of the proper use and possible adverse effects of hydroxyzine.  All of the patient's questions and concerns were addressed.
Hyrimoz Counseling:  I discussed with the patient the risks of adalimumab including but not limited to myelosuppression, immunosuppression, autoimmune hepatitis, demyelinating diseases, lymphoma, and serious infections.  The patient understands that monitoring is required including a PPD at baseline and must alert us or the primary physician if symptoms of infection or other concerning signs are noted.
Opioid Pregnancy And Lactation Text: These medications can lead to premature delivery and should be avoided during pregnancy. These medications are also present in breast milk in small amounts.
Valtrex Counseling: I discussed with the patient the risks of valacyclovir including but not limited to kidney damage, nausea, vomiting and severe allergy.  The patient understands that if the infection seems to be worsening or is not improving, they are to call.
Cantharidin Counseling:  I discussed with the patient the risks of Cantharidin including but not limited to pain, redness, burning, itching, and blistering.
Winlevi Counseling:  I discussed with the patient the risks of topical clascoterone including but not limited to erythema, scaling, itching, and stinging. Patient voiced their understanding.
Bimzelx Pregnancy And Lactation Text: This medication crosses the placenta and the safety is uncertain during pregnancy. It is unknown if this medication is present in breast milk.
Sarecycline Pregnancy And Lactation Text: This medication is Pregnancy Category D and not consider safe during pregnancy. It is also excreted in breast milk.
Ketoconazole Counseling:   Patient counseled regarding improving absorption with orange juice.  Adverse effects include but are not limited to breast enlargement, headache, diarrhea, nausea, upset stomach, liver function test abnormalities, taste disturbance, and stomach pain.  There is a rare possibility of liver failure that can occur when taking ketoconazole. The patient understands that monitoring of LFTs may be required, especially at baseline. The patient verbalized understanding of the proper use and possible adverse effects of ketoconazole.  All of the patient's questions and concerns were addressed.
Humira Counseling:  I discussed with the patient the risks of adalimumab including but not limited to myelosuppression, immunosuppression, autoimmune hepatitis, demyelinating diseases, lymphoma, and serious infections.  The patient understands that monitoring is required including a PPD at baseline and must alert us or the primary physician if symptoms of infection or other concerning signs are noted.
Oxybutynin Pregnancy And Lactation Text: This medication is Pregnancy Category B and is considered safe during pregnancy. It is unknown if it is excreted in breast milk.
Methotrexate Counseling:  Patient counseled regarding adverse effects of methotrexate including but not limited to nausea, vomiting, abnormalities in liver function tests. Patients may develop mouth sores, rash, diarrhea, and abnormalities in blood counts. The patient understands that monitoring is required including LFT's and blood counts.  There is a rare possibility of scarring of the liver and lung problems that can occur when taking methotrexate. Persistent nausea, loss of appetite, pale stools, dark urine, cough, and shortness of breath should be reported immediately. Patient advised to discontinue methotrexate treatment at least three months before attempting to become pregnant.  I discussed the need for folate supplements while taking methotrexate.  These supplements can decrease side effects during methotrexate treatment. The patient verbalized understanding of the proper use and possible adverse effects of methotrexate.  All of the patient's questions and concerns were addressed.
Aklief counseling:  Patient advised to apply a pea-sized amount only at bedtime and wait 30 minutes after washing their face before applying.  If too drying, patient may add a non-comedogenic moisturizer.  The most commonly reported side effects including irritation, redness, scaling, dryness, stinging, burning, itching, and increased risk of sunburn.  The patient verbalized understanding of the proper use and possible adverse effects of retinoids.  All of the patient's questions and concerns were addressed.
Spironolactone Pregnancy And Lactation Text: This medication can cause feminization of the male fetus and should be avoided during pregnancy. The active metabolite is also found in breast milk.
Simponi Counseling:  I discussed with the patient the risks of golimumab including but not limited to myelosuppression, immunosuppression, autoimmune hepatitis, demyelinating diseases, lymphoma, and serious infections.  The patient understands that monitoring is required including a PPD at baseline and must alert us or the primary physician if symptoms of infection or other concerning signs are noted.
Gabapentin Counseling: I discussed with the patient the risks of gabapentin including but not limited to dizziness, somnolence, fatigue and ataxia.
Bactrim Counseling:  I discussed with the patient the risks of sulfa antibiotics including but not limited to GI upset, allergic reaction, drug rash, diarrhea, dizziness, photosensitivity, and yeast infections.  Rarely, more serious reactions can occur including but not limited to aplastic anemia, agranulocytosis, methemoglobinemia, blood dyscrasias, liver or kidney failure, lung infiltrates or desquamative/blistering drug rashes.
Nsaids Counseling: NSAID Counseling: I discussed with the patient that NSAIDs should be taken with food. Prolonged use of NSAIDs can result in the development of stomach ulcers.  Patient advised to stop taking NSAIDs if abdominal pain occurs.  The patient verbalized understanding of the proper use and possible adverse effects of NSAIDs.  All of the patient's questions and concerns were addressed.
Olumiant Pregnancy And Lactation Text: Based on animal studies, Olumiant may cause embryo-fetal harm when administered to pregnant women.  The medication should not be used in pregnancy.  Breastfeeding is not recommended during treatment.
Dutasteride Male Counseling: Dustasteride Counseling:  I discussed with the patient the risks of use of dutasteride including but not limited to decreased libido, decreased ejaculate volume, and gynecomastia. Women who can become pregnant should not handle medication.  All of the patient's questions and concerns were addressed.
Cantharidin Pregnancy And Lactation Text: This medication has not been proven safe during pregnancy. It is unknown if this medication is excreted in breast milk.
Solaraze Counseling:  I discussed with the patient the risks of Solaraze including but not limited to erythema, scaling, itching, weeping, crusting, and pain.
Opzelura Counseling:  I discussed with the patient the risks of Opzelura including but not limited to nasopharngitis, bronchitis, ear infection, eosinophila, hives, diarrhea, folliculitis, tonsillitis, and rhinorrhea.  Taken orally, this medication has been linked to serious infections; higher rate of mortality; malignancy and lymphoproliferative disorders; major adverse cardiovascular events; thrombosis; thrombocytopenia, anemia, and neutropenia; and lipid elevations.
Winlevi Pregnancy And Lactation Text: This medication is considered safe during pregnancy and breastfeeding.
Hydroxyzine Pregnancy And Lactation Text: This medication is not safe during pregnancy and should not be taken. It is also excreted in breast milk and breast feeding isn't recommended.
Metronidazole Counseling:  I discussed with the patient the risks of metronidazole including but not limited to seizures, nausea/vomiting, a metallic taste in the mouth, nausea/vomiting and severe allergy.
Ketoconazole Pregnancy And Lactation Text: This medication is Pregnancy Category C and it isn't know if it is safe during pregnancy. It is also excreted in breast milk and breast feeding isn't recommended.
Methotrexate Pregnancy And Lactation Text: This medication is Pregnancy Category X and is known to cause fetal harm. This medication is excreted in breast milk.
Aklief Pregnancy And Lactation Text: It is unknown if this medication is safe to use during pregnancy.  It is unknown if this medication is excreted in breast milk.  Breastfeeding women should use the topical cream on the smallest area of the skin for the shortest time needed while breastfeeding.  Do not apply to nipple and areola.
Opzelura Pregnancy And Lactation Text: There is insufficient data to evaluate drug-associated risk for major birth defects, miscarriage, or other adverse maternal or fetal outcomes.  There is a pregnancy registry that monitors pregnancy outcomes in pregnant persons exposed to the medication during pregnancy.  It is unknown if this medication is excreted in breast milk.  Do not breastfeed during treatment and for about 4 weeks after the last dose.
Acitretin Counseling:  I discussed with the patient the risks of acitretin including but not limited to hair loss, dry lips/skin/eyes, liver damage, hyperlipidemia, depression/suicidal ideation, photosensitivity.  Serious rare side effects can include but are not limited to pancreatitis, pseudotumor cerebri, bony changes, clot formation/stroke/heart attack.  Patient understands that alcohol is contraindicated since it can result in liver toxicity and significantly prolong the elimination of the drug by many years.
Bactrim Pregnancy And Lactation Text: This medication is Pregnancy Category D and is known to cause fetal risk.  It is also excreted in breast milk.
Topical Ketoconazole Counseling: Patient counseled that this medication may cause skin irritation or allergic reactions.  In the event of skin irritation, the patient was advised to reduce the amount of the drug applied or use it less frequently.   The patient verbalized understanding of the proper use and possible adverse effects of ketoconazole.  All of the patient's questions and concerns were addressed.
Xolair Counseling:  Patient informed of potential adverse effects including but not limited to fever, muscle aches, rash and allergic reactions.  The patient verbalized understanding of the proper use and possible adverse effects of Xolair.  All of the patient's questions and concerns were addressed.
Valtrex Pregnancy And Lactation Text: this medication is Pregnancy Category B and is considered safe during pregnancy. This medication is not directly found in breast milk but it's metabolite acyclovir is present.
5-Fu Counseling: 5-Fluorouracil Counseling:  I discussed with the patient the risks of 5-fluorouracil including but not limited to erythema, scaling, itching, weeping, crusting, and pain.
Imiquimod Counseling:  I discussed with the patient the risks of imiquimod including but not limited to erythema, scaling, itching, weeping, crusting, and pain.  Patient understands that the inflammatory response to imiquimod is variable from person to person and was educated regarded proper titration schedule.  If flu-like symptoms develop, patient knows to discontinue the medication and contact us.
Arava Counseling:  Patient counseled regarding adverse effects of Arava including but not limited to nausea, vomiting, abnormalities in liver function tests. Patients may develop mouth sores, rash, diarrhea, and abnormalities in blood counts. The patient understands that monitoring is required including LFTs and blood counts.  There is a rare possibility of scarring of the liver and lung problems that can occur when taking methotrexate. Persistent nausea, loss of appetite, pale stools, dark urine, cough, and shortness of breath should be reported immediately. Patient advised to discontinue Arava treatment and consult with a physician prior to attempting conception. The patient will have to undergo a treatment to eliminate Arava from the body prior to conception.
Dutasteride Female Counseling: Dutasteride Counseling:  I discussed with the patient the risks of use of dutasteride including but not limited to decreased libido and sexual dysfunction. Explained the teratogenic nature of the medication and stressed the importance of not getting pregnant during treatment. All of the patient's questions and concerns were addressed.
Rinvoq Counseling: I discussed with the patient the risks of Rinvoq therapy including but not limited to upper respiratory tract infections, shingles, cold sores, bronchitis, nausea, cough, fever, acne, and headache. Live vaccines should be avoided.  This medication has been linked to serious infections; higher rate of mortality; malignancy and lymphoproliferative disorders; major adverse cardiovascular events; thrombosis; thrombocytopenia, anemia, and neutropenia; lipid elevations; liver enzyme elevations; and gastrointestinal perforations.
Propranolol Counseling:  I discussed with the patient the risks of propranolol including but not limited to low heart rate, low blood pressure, low blood sugar, restlessness and increased cold sensitivity. They should call the office if they experience any of these side effects.
Cimzia Counseling:  I discussed with the patient the risks of Cimzia including but not limited to immunosuppression, allergic reactions and infections.  The patient understands that monitoring is required including a PPD at baseline and must alert us or the primary physician if symptoms of infection or other concerning signs are noted.
Tetracycline Counseling: Patient counseled regarding possible photosensitivity and increased risk for sunburn.  Patient instructed to avoid sunlight, if possible.  When exposed to sunlight, patients should wear protective clothing, sunglasses, and sunscreen.  The patient was instructed to call the office immediately if the following severe adverse effects occur:  hearing changes, easy bruising/bleeding, severe headache, or vision changes.  The patient verbalized understanding of the proper use and possible adverse effects of tetracycline.  All of the patient's questions and concerns were addressed. Patient understands to avoid pregnancy while on therapy due to potential birth defects.
Solaraze Pregnancy And Lactation Text: This medication is Pregnancy Category B and is considered safe. There is some data to suggest avoiding during the third trimester. It is unknown if this medication is excreted in breast milk.
Ilumya Counseling: I discussed with the patient the risks of tildrakizumab including but not limited to immunosuppression, malignancy, posterior leukoencephalopathy syndrome, and serious infections.  The patient understands that monitoring is required including a PPD at baseline and must alert us or the primary physician if symptoms of infection or other concerning signs are noted.
Metronidazole Pregnancy And Lactation Text: This medication is Pregnancy Category B and considered safe during pregnancy.  It is also excreted in breast milk.
Albendazole Counseling:  I discussed with the patient the risks of albendazole including but not limited to cytopenia, kidney damage, nausea/vomiting and severe allergy.  The patient understands that this medication is being used in an off-label manner.
Azathioprine Counseling:  I discussed with the patient the risks of azathioprine including but not limited to myelosuppression, immunosuppression, hepatotoxicity, lymphoma, and infections.  The patient understands that monitoring is required including baseline LFTs, Creatinine, possible TPMP genotyping and weekly CBCs for the first month and then every 2 weeks thereafter.  The patient verbalized understanding of the proper use and possible adverse effects of azathioprine.  All of the patient's questions and concerns were addressed.
Acitretin Pregnancy And Lactation Text: This medication is Pregnancy Category X and should not be given to women who are pregnant or may become pregnant in the future. This medication is excreted in breast milk.
Cephalexin Counseling: I counseled the patient regarding use of cephalexin as an antibiotic for prophylactic and/or therapeutic purposes. Cephalexin (commonly prescribed under brand name Keflex) is a cephalosporin antibiotic which is active against numerous classes of bacteria, including most skin bacteria. Side effects may include nausea, diarrhea, gastrointestinal upset, rash, hives, yeast infections, and in rare cases, hepatitis, kidney disease, seizures, fever, confusion, neurologic symptoms, and others. Patients with severe allergies to penicillin medications are cautioned that there is about a 10% incidence of cross-reactivity with cephalosporins. When possible, patients with penicillin allergies should use alternatives to cephalosporins for antibiotic therapy.
Use Enhanced Medication Counseling?: No
Glycopyrrolate Counseling:  I discussed with the patient the risks of glycopyrrolate including but not limited to skin rash, drowsiness, dry mouth, difficulty urinating, and blurred vision.
Xolair Pregnancy And Lactation Text: This medication is Pregnancy Category B and is considered safe during pregnancy. This medication is excreted in breast milk.
Nsaids Pregnancy And Lactation Text: These medications are considered safe up to 30 weeks gestation. It is excreted in breast milk.
VTAMA Counseling: I discussed with the patient that VTAMA is not for use in the eyes, mouth or mouth. They should call the office if they develop any signs of allergic reactions to VTAMA. The patient verbalized understanding of the proper use and possible adverse effects of VTAMA.  All of the patient's questions and concerns were addressed.
Prednisone Counseling:  I discussed with the patient the risks of prolonged use of prednisone including but not limited to weight gain, insomnia, osteoporosis, mood changes, diabetes, susceptibility to infection, glaucoma and high blood pressure.  In cases where prednisone use is prolonged, patients should be monitored with blood pressure checks, serum glucose levels and an eye exam.  Additionally, the patient may need to be placed on GI prophylaxis, PCP prophylaxis, and calcium and vitamin D supplementation and/or a bisphosphonate.  The patient verbalized understanding of the proper use and the possible adverse effects of prednisone.  All of the patient's questions and concerns were addressed.
Propranolol Pregnancy And Lactation Text: This medication is Pregnancy Category C and it isn't known if it is safe during pregnancy. It is excreted in breast milk.
Terbinafine Counseling: Patient counseling regarding adverse effects of terbinafine including but not limited to headache, diarrhea, rash, upset stomach, liver function test abnormalities, itching, taste/smell disturbance, nausea, abdominal pain, and flatulence.  There is a rare possibility of liver failure that can occur when taking terbinafine.  The patient understands that a baseline LFT and kidney function test may be required. The patient verbalized understanding of the proper use and possible adverse effects of terbinafine.  All of the patient's questions and concerns were addressed.
Skyrizi Counseling: I discussed with the patient the risks of risankizumab-rzaa including but not limited to immunosuppression, and serious infections.  The patient understands that monitoring is required including a PPD at baseline and must alert us or the primary physician if symptoms of infection or other concerning signs are noted.
Azelaic Acid Counseling: Patient counseled that medicine may cause skin irritation and to avoid applying near the eyes.  In the event of skin irritation, the patient was advised to reduce the amount of the drug applied or use it less frequently.   The patient verbalized understanding of the proper use and possible adverse effects of azelaic acid.  All of the patient's questions and concerns were addressed.
Cimzia Pregnancy And Lactation Text: This medication crosses the placenta but can be considered safe in certain situations. Cimzia may be excreted in breast milk.
Azathioprine Pregnancy And Lactation Text: This medication is Pregnancy Category D and isn't considered safe during pregnancy. It is unknown if this medication is excreted in breast milk.
Minocycline Counseling: Patient advised regarding possible photosensitivity and discoloration of the teeth, skin, lips, tongue and gums.  Patient instructed to avoid sunlight, if possible.  When exposed to sunlight, patients should wear protective clothing, sunglasses, and sunscreen.  The patient was instructed to call the office immediately if the following severe adverse effects occur:  hearing changes, easy bruising/bleeding, severe headache, or vision changes.  The patient verbalized understanding of the proper use and possible adverse effects of minocycline.  All of the patient's questions and concerns were addressed.
Dutasteride Pregnancy And Lactation Text: This medication is absolutely contraindicated in women, especially during pregnancy and breast feeding. Feminization of male fetuses is possible if taking while pregnant.
Picato Counseling:  I discussed with the patient the risks of Picato including but not limited to erythema, scaling, itching, weeping, crusting, and pain.
Rinvoq Pregnancy And Lactation Text: Based on animal studies, Rinvoq may cause embryo-fetal harm when administered to pregnant women.  The medication should not be used in pregnancy.  Breastfeeding is not recommended during treatment and for 6 days after the last dose.
Detail Level: Simple
Albendazole Pregnancy And Lactation Text: This medication is Pregnancy Category C and it isn't known if it is safe during pregnancy. It is also excreted in breast milk.
Soolantra Counseling: I discussed with the patients the risks of topial Soolantra. This is a medicine which decreases the number of mites and inflammation in the skin. You experience burning, stinging, eye irritation or allergic reactions.  Please call our office if you develop any problems from using this medication.
Olanzapine Counseling- I discussed with the patient the common side effects of olanzapine including but are not limited to: lack of energy, dry mouth, increased appetite, sleepiness, tremor, constipation, dizziness, changes in behavior, or restlessness.  Explained that teenagers are more likely to experience headaches, abdominal pain, pain in the arms or legs, tiredness, and sleepiness.  Serious side effects include but are not limited: increased risk of death in elderly patients who are confused, have memory loss, or dementia-related psychosis; hyperglycemia; increased cholesterol and triglycerides; and weight gain.
Vtama Pregnancy And Lactation Text: It is unknown if this medication can cause problems during pregnancy and breastfeeding.
Glycopyrrolate Pregnancy And Lactation Text: This medication is Pregnancy Category B and is considered safe during pregnancy. It is unknown if it is excreted breast milk.
Azelaic Acid Pregnancy And Lactation Text: This medication is considered safe during pregnancy and breast feeding.
Drysol Counseling:  I discussed with the patient the risks of drysol/aluminum chloride including but not limited to skin rash, itching, irritation, burning.
Bexarotene Counseling:  I discussed with the patient the risks of bexarotene including but not limited to hair loss, dry lips/skin/eyes, liver abnormalities, hyperlipidemia, pancreatitis, depression/suicidal ideation, photosensitivity, drug rash/allergic reactions, hypothyroidism, anemia, leukopenia, infection, cataracts, and teratogenicity.  Patient understands that they will need regular blood tests to check lipid profile, liver function tests, white blood cell count, thyroid function tests and pregnancy test if applicable.
Cephalexin Pregnancy And Lactation Text: This medication is Pregnancy Category B and considered safe during pregnancy.  It is also excreted in breast milk but can be used safely for shorter doses.
Finasteride Male Counseling: Finasteride Counseling:  I discussed with the patient the risks of use of finasteride including but not limited to decreased libido, decreased ejaculate volume, gynecomastia, and depression. Women should not handle medication.  All of the patient's questions and concerns were addressed.
Topical Metronidazole Counseling: Metronidazole is a topical antibiotic medication. You may experience burning, stinging, redness, or allergic reactions.  Please call our office if you develop any problems from using this medication.
Klisyri Counseling:  I discussed with the patient the risks of Klisyri including but not limited to erythema, scaling, itching, weeping, crusting, and pain.
Infliximab Counseling:  I discussed with the patient the risks of infliximab including but not limited to myelosuppression, immunosuppression, autoimmune hepatitis, demyelinating diseases, lymphoma, and serious infections.  The patient understands that monitoring is required including a PPD at baseline and must alert us or the primary physician if symptoms of infection or other concerning signs are noted.
Erivedge Counseling- I discussed with the patient the risks of Erivedge including but not limited to nausea, vomiting, diarrhea, constipation, weight loss, changes in the sense of taste, decreased appetite, muscle spasms, and hair loss.  The patient verbalized understanding of the proper use and possible adverse effects of Erivedge.  All of the patient's questions and concerns were addressed.
Sotyktu Counseling:  I discussed the most common side effects of Sotyktu including: common cold, sore throat, sinus infections, cold sores, canker sores, folliculitis, and acne.? I also discussed more serious side effects of Sotyktu including but not limited to: serious allergic reactions; increased risk for infections such as TB; cancers such as lymphomas; rhabdomyolysis and elevated CPK; and elevated triglycerides and liver enzymes.?
SSKI Counseling:  I discussed with the patient the risks of SSKI including but not limited to thyroid abnormalities, metallic taste, GI upset, fever, headache, acne, arthralgias, paraesthesias, lymphadenopathy, easy bleeding, arrhythmias, and allergic reaction.
Clofazimine Counseling:  I discussed with the patient the risks of clofazimine including but not limited to skin and eye pigmentation, liver damage, nausea/vomiting, gastrointestinal bleeding and allergy.
Fluconazole Counseling:  Patient counseled regarding adverse effects of fluconazole including but not limited to headache, diarrhea, nausea, upset stomach, liver function test abnormalities, taste disturbance, and stomach pain.  There is a rare possibility of liver failure that can occur when taking fluconazole.  The patient understands that monitoring of LFTs and kidney function test may be required, especially at baseline. The patient verbalized understanding of the proper use and possible adverse effects of fluconazole.  All of the patient's questions and concerns were addressed.
Zoryve Counseling:  I discussed with the patient that Zoryve is not for use in the eyes, mouth or vagina. The most commonly reported side effects include diarrhea, headache, insomnia, application site pain, upper respiratory tract infections, and urinary tract infections.  All of the patient's questions and concerns were addressed.
Cosentyx Counseling:  I discussed with the patient the risks of Cosentyx including but not limited to worsening of Crohn's disease, immunosuppression, allergic reactions and infections.  The patient understands that monitoring is required including a PPD at baseline and must alert us or the primary physician if symptoms of infection or other concerning signs are noted.
Olanzapine Pregnancy And Lactation Text: This medication is pregnancy category C.   There are no adequate and well controlled trials with olanzapine in pregnant females.  Olanzapine should be used during pregnancy only if the potential benefit justifies the potential risk to the fetus.   In a study in lactating healthy women, olanzapine was excreted in breast milk.  It is recommended that women taking olanzapine should not breast feed.
Cellcept Counseling:  I discussed with the patient the risks of mycophenolate mofetil including but not limited to infection/immunosuppression, GI upset, hypokalemia, hypercholesterolemia, bone marrow suppression, lymphoproliferative disorders, malignancy, GI ulceration/bleed/perforation, colitis, interstitial lung disease, kidney failure, progressive multifocal leukoencephalopathy, and birth defects.  The patient understands that monitoring is required including a baseline creatinine and regular CBC testing. In addition, patient must alert us immediately if symptoms of infection or other concerning signs are noted.
Terbinafine Pregnancy And Lactation Text: This medication is Pregnancy Category B and is considered safe during pregnancy. It is also excreted in breast milk and breast feeding isn't recommended.
Protopic Counseling: Patient may experience a mild burning sensation during topical application. Protopic is not approved in children less than 2 years of age. There have been case reports of hematologic and skin malignancies in patients using topical calcineurin inhibitors although causality is questionable.
Bexarotene Pregnancy And Lactation Text: This medication is Pregnancy Category X and should not be given to women who are pregnant or may become pregnant. This medication should not be used if you are breast feeding.
Clindamycin Counseling: I counseled the patient regarding use of clindamycin as an antibiotic for prophylactic and/or therapeutic purposes. Clindamycin is active against numerous classes of bacteria, including skin bacteria. Side effects may include nausea, diarrhea, gastrointestinal upset, rash, hives, yeast infections, and in rare cases, colitis.
Sotyktu Pregnancy And Lactation Text: There is insufficient data to evaluate whether or not Sotyktu is safe to use during pregnancy.? ?It is not known if Sotyktu passes into breast milk and whether or not it is safe to use when breastfeeding.??
Ivermectin Counseling:  Patient instructed to take medication on an empty stomach with a full glass of water.  Patient informed of potential adverse effects including but not limited to nausea, diarrhea, dizziness, itching, and swelling of the extremities or lymph nodes.  The patient verbalized understanding of the proper use and possible adverse effects of ivermectin.  All of the patient's questions and concerns were addressed.
Sski Pregnancy And Lactation Text: This medication is Pregnancy Category D and isn't considered safe during pregnancy. It is excreted in breast milk.
Benzoyl Peroxide Counseling: Patient counseled that medicine may cause skin irritation and bleach clothing.  In the event of skin irritation, the patient was advised to reduce the amount of the drug applied or use it less frequently.   The patient verbalized understanding of the proper use and possible adverse effects of benzoyl peroxide.  All of the patient's questions and concerns were addressed.
Stelara Counseling:  I discussed with the patient the risks of ustekinumab including but not limited to immunosuppression, malignancy, posterior leukoencephalopathy syndrome, and serious infections.  The patient understands that monitoring is required including a PPD at baseline and must alert us or the primary physician if symptoms of infection or other concerning signs are noted.
Soolantra Pregnancy And Lactation Text: This medication is Pregnancy Category C. This medication is considered safe during breast feeding.
Hydroxychloroquine Counseling:  I discussed with the patient that a baseline ophthalmologic exam is needed at the start of therapy and every year thereafter while on therapy. A CBC may also be warranted for monitoring.  The side effects of this medication were discussed with the patient, including but not limited to agranulocytosis, aplastic anemia, seizures, rashes, retinopathy, and liver toxicity. Patient instructed to call the office should any adverse effect occur.  The patient verbalized understanding of the proper use and possible adverse effects of Plaquenil.  All the patient's questions and concerns were addressed.
Oral Minoxidil Counseling- I discussed with the patient the risks of oral minoxidil including but not limited to shortness of breath, swelling of the feet or ankles, dizziness, lightheadedness, unwanted hair growth and allergic reaction.  The patient verbalized understanding of the proper use and possible adverse effects of oral minoxidil.  All of the patient's questions and concerns were addressed.
Finasteride Female Counseling: Finasteride Counseling:  I discussed with the patient the risks of use of finasteride including but not limited to decreased libido and sexual dysfunction. Explained the teratogenic nature of the medication and stressed the importance of not getting pregnant during treatment. All of the patient's questions and concerns were addressed.
Cimetidine Counseling:  I discussed with the patient the risks of Cimetidine including but not limited to gynecomastia, headache, diarrhea, nausea, drowsiness, arrhythmias, pancreatitis, skin rashes, psychosis, bone marrow suppression and kidney toxicity.
Quinolones Counseling:  I discussed with the patient the risks of fluoroquinolones including but not limited to GI upset, allergic reaction, drug rash, diarrhea, dizziness, photosensitivity, yeast infections, liver function test abnormalities, tendonitis/tendon rupture.
Topical Retinoid counseling:  Patient advised to apply a pea-sized amount only at bedtime and wait 30 minutes after washing their face before applying.  If too drying, patient may add a non-comedogenic moisturizer. The patient verbalized understanding of the proper use and possible adverse effects of retinoids.  All of the patient's questions and concerns were addressed.
Isotretinoin Counseling: Patient should get monthly blood tests, not donate blood, not drive at night if vision affected, not share medication, and not undergo elective surgery for 6 months after tx completed. Side effects reviewed, pt to contact office should one occur.
Clindamycin Pregnancy And Lactation Text: This medication can be used in pregnancy if certain situations. Clindamycin is also present in breast milk.
Topical Metronidazole Pregnancy And Lactation Text: This medication is Pregnancy Category B and considered safe during pregnancy.  It is also considered safe to use while breastfeeding.
Klisyri Pregnancy And Lactation Text: It is unknown if this medication can harm a developing fetus or if it is excreted in breast milk.
Cibinqo Counseling: I discussed with the patient the risks of Cibinqo therapy including but not limited to common cold, nausea, headache, cold sores, increased blood CPK levels, dizziness, UTIs, fatigue, acne, and vomitting. Live vaccines should be avoided.  This medication has been linked to serious infections; higher rate of mortality; malignancy and lymphoproliferative disorders; major adverse cardiovascular events; thrombosis; thrombocytopenia and lymphopenia; lipid elevations; and retinal detachment.
Thalidomide Counseling: I discussed with the patient the risks of thalidomide including but not limited to birth defects, anxiety, weakness, chest pain, dizziness, cough and severe allergy.
Libtayo Counseling- I discussed with the patient the risks of Libtayo including but not limited to nausea, vomiting, diarrhea, and bone or muscle pain.  The patient verbalized understanding of the proper use and possible adverse effects of Libtayo.  All of the patient's questions and concerns were addressed.
Topical Sulfur Applications Counseling: Topical Sulfur Counseling: Patient counseled that this medication may cause skin irritation or allergic reactions.  In the event of skin irritation, the patient was advised to reduce the amount of the drug applied or use it less frequently.   The patient verbalized understanding of the proper use and possible adverse effects of topical sulfur application.  All of the patient's questions and concerns were addressed.
Benzoyl Peroxide Pregnancy And Lactation Text: This medication is Pregnancy Category C. It is unknown if benzoyl peroxide is excreted in breast milk.
Protopic Pregnancy And Lactation Text: This medication is Pregnancy Category C. It is unknown if this medication is excreted in breast milk when applied topically.
Hydroxychloroquine Pregnancy And Lactation Text: This medication has been shown to cause fetal harm but it isn't assigned a Pregnancy Risk Category. There are small amounts excreted in breast milk.
Topical Steroids Counseling: I discussed with the patient that prolonged use of topical steroids can result in the increased appearance of superficial blood vessels (telangiectasias), lightening (hypopigmentation) and thinning of the skin (atrophy).  Patient understands to avoid using high potency steroids in skin folds, the groin or the face.  The patient verbalized understanding of the proper use and possible adverse effects of topical steroids.  All of the patient's questions and concerns were addressed.
Colchicine Counseling:  Patient counseled regarding adverse effects including but not limited to stomach upset (nausea, vomiting, stomach pain, or diarrhea).  Patient instructed to limit alcohol consumption while taking this medication.  Colchicine may reduce blood counts especially with prolonged use.  The patient understands that monitoring of kidney function and blood counts may be required, especially at baseline. The patient verbalized understanding of the proper use and possible adverse effects of colchicine.  All of the patient's questions and concerns were addressed.
Elidel Counseling: Patient may experience a mild burning sensation during topical application. Elidel is not approved in children less than 2 years of age. There have been case reports of hematologic and skin malignancies in patients using topical calcineurin inhibitors although causality is questionable.
Rituxan Counseling:  I discussed with the patient the risks of Rituxan infusions. Side effects can include infusion reactions, severe drug rashes including mucocutaneous reactions, reactivation of latent hepatitis and other infections and rarely progressive multifocal leukoencephalopathy.  All of the patient's questions and concerns were addressed.
Dupixent Counseling: I discussed with the patient the risks of dupilumab including but not limited to eye infection and irritation, cold sores, injection site reactions, worsening of asthma, allergic reactions and increased risk of parasitic infection.  Live vaccines should be avoided while taking dupilumab. Dupilumab will also interact with certain medications such as warfarin and cyclosporine. The patient understands that monitoring is required and they must alert us or the primary physician if symptoms of infection or other concerning signs are noted.
Xeljanz Counseling: I discussed with the patient the risks of Xeljanz therapy including increased risk of infection, liver issues, headache, diarrhea, or cold symptoms. Live vaccines should be avoided. They were instructed to call if they have any problems.
Doxycycline Counseling:  Patient counseled regarding possible photosensitivity and increased risk for sunburn.  Patient instructed to avoid sunlight, if possible.  When exposed to sunlight, patients should wear protective clothing, sunglasses, and sunscreen.  The patient was instructed to call the office immediately if the following severe adverse effects occur:  hearing changes, easy bruising/bleeding, severe headache, or vision changes.  The patient verbalized understanding of the proper use and possible adverse effects of doxycycline.  All of the patient's questions and concerns were addressed.
Cibinqo Pregnancy And Lactation Text: It is unknown if this medication will adversely affect pregnancy or breast feeding.  You should not take this medication if you are currently pregnant or planning a pregnancy or while breastfeeding.
Oral Minoxidil Pregnancy And Lactation Text: This medication should only be used when clearly needed if you are pregnant, attempting to become pregnant or breast feeding.
Cyclophosphamide Counseling:  I discussed with the patient the risks of cyclophosphamide including but not limited to hair loss, hormonal abnormalities, decreased fertility, abdominal pain, diarrhea, nausea and vomiting, bone marrow suppression and infection. The patient understands that monitoring is required while taking this medication.
Zyclara Counseling:  I discussed with the patient the risks of imiquimod including but not limited to erythema, scaling, itching, weeping, crusting, and pain.  Patient understands that the inflammatory response to imiquimod is variable from person to person and was educated regarded proper titration schedule.  If flu-like symptoms develop, patient knows to discontinue the medication and contact us.
Finasteride Pregnancy And Lactation Text: This medication is absolutely contraindicated during pregnancy. It is unknown if it is excreted in breast milk.
Minoxidil Counseling: Minoxidil is a topical medication which can increase blood flow where it is applied. It is uncertain how this medication increases hair growth. Side effects are uncommon and include stinging and allergic reactions.
Low Dose Naltrexone Counseling- I discussed with the patient the potential risks and side effects of low dose naltrexone including but not limited to: more vivid dreams, headaches, nausea, vomiting, abdominal pain, fatigue, dizziness, and anxiety.
Topical Sulfur Applications Pregnancy And Lactation Text: This medication is considered safe during pregnancy and breast feeding secondary to limited systemic absorption.
Griseofulvin Counseling:  I discussed with the patient the risks of griseofulvin including but not limited to photosensitivity, cytopenia, liver damage, nausea/vomiting and severe allergy.  The patient understands that this medication is best absorbed when taken with a fatty meal (e.g., ice cream or french fries).
Isotretinoin Pregnancy And Lactation Text: This medication is Pregnancy Category X and is considered extremely dangerous during pregnancy. It is unknown if it is excreted in breast milk.

## 2024-06-11 ENCOUNTER — APPOINTMENT (OUTPATIENT)
Dept: URBAN - METROPOLITAN AREA CLINIC 206 | Age: 54
Setting detail: DERMATOLOGY
End: 2024-06-11

## 2024-06-11 DIAGNOSIS — L57.0 ACTINIC KERATOSIS: ICD-10-CM

## 2024-06-11 PROCEDURE — OTHER LIQUID NITROGEN: OTHER

## 2024-06-11 PROCEDURE — OTHER COUNSELING: OTHER

## 2024-06-11 PROCEDURE — 17000 DESTRUCT PREMALG LESION: CPT

## 2024-06-11 PROCEDURE — OTHER MIPS QUALITY: OTHER

## 2024-06-11 ASSESSMENT — LOCATION DETAILED DESCRIPTION DERM: LOCATION DETAILED: LEFT INFERIOR MEDIAL FOREHEAD

## 2024-06-11 ASSESSMENT — LOCATION ZONE DERM: LOCATION ZONE: FACE

## 2024-06-11 ASSESSMENT — LOCATION SIMPLE DESCRIPTION DERM: LOCATION SIMPLE: LEFT FOREHEAD

## 2024-06-11 NOTE — PROCEDURE: LIQUID NITROGEN
Duration Of Freeze Thaw-Cycle (Seconds): 5
Post-Care Instructions: I reviewed with the patient in detail post-care instructions. Patient is to wear sunprotection (spf 30+) and avoid picking at any of the treated lesions. Patient may apply Vaseline or Aquaphor to crusted or scabbing areas.
Show Aperture Variable?: Yes
Detail Level: Detailed
Number Of Freeze-Thaw Cycles: 2 freeze-thaw cycles
Consent: The patient's consent was obtained including but not limited to risks of crusting, scabbing, blistering, scarring, darker or lighter pigmentary change, recurrence, incomplete removal and infection.
Application Tool (Optional): Liquid Nitrogen Sprayer
Render Note In Bullet Format When Appropriate: No

## 2024-07-17 ENCOUNTER — APPOINTMENT (OUTPATIENT)
Dept: URBAN - METROPOLITAN AREA CLINIC 208 | Age: 54
Setting detail: DERMATOLOGY
End: 2024-07-24

## 2024-07-17 DIAGNOSIS — L57.0 ACTINIC KERATOSIS: ICD-10-CM

## 2024-07-17 DIAGNOSIS — L57.8 OTHER SKIN CHANGES DUE TO CHRONIC EXPOSURE TO NONIONIZING RADIATION: ICD-10-CM

## 2024-07-17 PROCEDURE — OTHER ADDITIONAL NOTES: OTHER

## 2024-07-17 PROCEDURE — OTHER MIPS QUALITY: OTHER

## 2024-07-17 PROCEDURE — OTHER COUNSELING: OTHER

## 2024-07-17 PROCEDURE — 99214 OFFICE O/P EST MOD 30 MIN: CPT

## 2024-07-17 PROCEDURE — OTHER PRESCRIPTION MEDICATION MANAGEMENT: OTHER

## 2024-07-17 ASSESSMENT — LOCATION DETAILED DESCRIPTION DERM
LOCATION DETAILED: LEFT MEDIAL FOREHEAD
LOCATION DETAILED: LEFT FOREHEAD
LOCATION DETAILED: LEFT FOREHEAD

## 2024-07-17 ASSESSMENT — LOCATION ZONE DERM
LOCATION ZONE: FACE
LOCATION ZONE: FACE

## 2024-07-17 ASSESSMENT — LOCATION SIMPLE DESCRIPTION DERM
LOCATION SIMPLE: LEFT FOREHEAD
LOCATION SIMPLE: LEFT FOREHEAD

## 2024-07-17 NOTE — PROCEDURE: ADDITIONAL NOTES
Additional Notes: Will treat with topical klysiri x 5 days \\nReviewed field therapy expectations (worse days for treatment are 8-12) \\nRecheck in 4 weeks\\nConsider bx at that time if not responsive
Detail Level: Simple
Render Risk Assessment In Note?: no

## 2024-07-19 ENCOUNTER — APPOINTMENT (OUTPATIENT)
Dept: URBAN - METROPOLITAN AREA CLINIC 208 | Age: 54
Setting detail: DERMATOLOGY
End: 2024-07-21

## 2024-07-19 DIAGNOSIS — L20.89 OTHER ATOPIC DERMATITIS: ICD-10-CM

## 2024-07-19 DIAGNOSIS — L08.9 LOCAL INFECTION OF THE SKIN AND SUBCUTANEOUS TISSUE, UNSPECIFIED: ICD-10-CM

## 2024-07-19 DIAGNOSIS — L663 OTHER SPECIFIED DISEASES OF HAIR AND HAIR FOLLICLES: ICD-10-CM

## 2024-07-19 DIAGNOSIS — L738 OTHER SPECIFIED DISEASES OF HAIR AND HAIR FOLLICLES: ICD-10-CM

## 2024-07-19 PROBLEM — R68.89 OTHER GENERAL SYMPTOMS AND SIGNS: Status: ACTIVE | Noted: 2024-07-19

## 2024-07-19 PROBLEM — L02.221 FURUNCLE OF ABDOMINAL WALL: Status: ACTIVE | Noted: 2024-07-19

## 2024-07-19 PROCEDURE — OTHER PRESCRIPTION: OTHER

## 2024-07-19 PROCEDURE — OTHER ORDER TESTS: OTHER

## 2024-07-19 PROCEDURE — 99214 OFFICE O/P EST MOD 30 MIN: CPT

## 2024-07-19 PROCEDURE — OTHER PRESCRIPTION MEDICATION MANAGEMENT: OTHER

## 2024-07-19 PROCEDURE — OTHER COUNSELING: OTHER

## 2024-07-19 PROCEDURE — OTHER MIPS QUALITY: OTHER

## 2024-07-19 RX ORDER — GENTAMICIN SULFATE 1 MG/G
OINTMENT TOPICAL
Qty: 30 | Refills: 0 | Status: ERX | COMMUNITY
Start: 2024-07-19

## 2024-07-19 RX ORDER — BETAMETHASONE DIPROPIONATE 0.5 MG/G
CREAM TOPICAL
Qty: 15 | Refills: 1 | Status: ERX | COMMUNITY
Start: 2024-07-19

## 2024-07-19 RX ORDER — DOXYCYCLINE HYCLATE 100 MG/1
CAPSULE, GELATIN COATED ORAL
Qty: 14 | Refills: 0 | Status: ERX | COMMUNITY
Start: 2024-07-19

## 2024-07-19 ASSESSMENT — LOCATION ZONE DERM
LOCATION ZONE: NOSE
LOCATION ZONE: TRUNK
LOCATION ZONE: HAND

## 2024-07-19 ASSESSMENT — LOCATION SIMPLE DESCRIPTION DERM
LOCATION SIMPLE: LEFT BREAST
LOCATION SIMPLE: LEFT NOSE
LOCATION SIMPLE: RIGHT HAND

## 2024-07-19 ASSESSMENT — LOCATION DETAILED DESCRIPTION DERM
LOCATION DETAILED: LEFT INFRAMAMMARY CREASE (INNER QUADRANT)
LOCATION DETAILED: RIGHT DORSAL INDEX FINGER METACARPOPHALANGEAL JOINT
LOCATION DETAILED: LEFT NARIS

## 2024-07-19 NOTE — PROCEDURE: ORDER TESTS
Expected Date Of Service: 07/19/2024
Performing Laboratory: [id number]
Billing Type: Third-Party Bill
Bill For Surgical Tray: no

## 2024-07-19 NOTE — PROCEDURE: PRESCRIPTION MEDICATION MANAGEMENT
Initiate Treatment: betamethasone dipropionate 0.05 % topical cream - Apply to affected area on hand twice a day for2 weeks, stop for 2 weeks, then repeat as needed for flares. (LOCAL [facility]ARM[facility])
Detail Level: Simple
Render In Strict Bullet Format?: No
Initiate Treatment: Doxycycline hyclate 100 mg capsule - Take 1 capsule by mouth twice a day with a full meal and a glass of water x 7 days. Do not lay down for 30 minutes after taking. (LOCAL PHARM[facility]Y)\\n\\nGentamicin 0.1 % topical ointment - Apply a small amount into nose twice daily for 5 days. (LOCAL [facility]Y)

## 2024-07-19 NOTE — HPI: SKIN LESION
What Type Of Note Output Would You Prefer (Optional)?: Standard Output
How Severe Is Your Skin Lesion?: moderate
Has Your Skin Lesion Been Treated?: not been treated
Is This A New Presentation, Or A Follow-Up?: Skin Lesion
Additional History: Pt has a hx of staph infection

## 2024-07-31 ENCOUNTER — APPOINTMENT (OUTPATIENT)
Dept: URBAN - METROPOLITAN AREA CLINIC 208 | Age: 54
Setting detail: DERMATOLOGY
End: 2024-07-31

## 2024-07-31 DIAGNOSIS — L57.8 OTHER SKIN CHANGES DUE TO CHRONIC EXPOSURE TO NONIONIZING RADIATION: ICD-10-CM

## 2024-07-31 DIAGNOSIS — L08.9 LOCAL INFECTION OF THE SKIN AND SUBCUTANEOUS TISSUE, UNSPECIFIED: ICD-10-CM

## 2024-07-31 PROCEDURE — OTHER MIPS QUALITY: OTHER

## 2024-07-31 PROCEDURE — 99214 OFFICE O/P EST MOD 30 MIN: CPT

## 2024-07-31 PROCEDURE — OTHER COUNSELING: OTHER

## 2024-07-31 PROCEDURE — OTHER PRESCRIPTION MEDICATION MANAGEMENT: OTHER

## 2024-07-31 PROCEDURE — OTHER ADDITIONAL NOTES: OTHER

## 2024-07-31 ASSESSMENT — LOCATION DETAILED DESCRIPTION DERM
LOCATION DETAILED: LEFT NARIS
LOCATION DETAILED: INFERIOR MID FOREHEAD

## 2024-07-31 ASSESSMENT — LOCATION SIMPLE DESCRIPTION DERM
LOCATION SIMPLE: INFERIOR FOREHEAD
LOCATION SIMPLE: LEFT NOSE

## 2024-07-31 ASSESSMENT — LOCATION ZONE DERM
LOCATION ZONE: FACE
LOCATION ZONE: NOSE

## 2024-07-31 NOTE — PROCEDURE: PRESCRIPTION MEDICATION MANAGEMENT
Initiate Treatment: OTC Hydrocortisone 1% ointment- Apply a thin layer to fissure twice a day for a week
Continue Regimen: Gentamicin 0.1 % topical ointment - Apply a small amount into nose twice daily for 5 days. ([facility])
Detail Level: Simple
Render In Strict Bullet Format?: No

## 2024-07-31 NOTE — PROCEDURE: ADDITIONAL NOTES
Additional Notes: Upon exam, lesion of concern is clear. Will recheck in 4 weeks.
Render Risk Assessment In Note?: no
Detail Level: Simple
Additional Notes: Recommended patient be evaluated by [facility]

## 2024-09-10 ENCOUNTER — APPOINTMENT (OUTPATIENT)
Dept: URBAN - METROPOLITAN AREA CLINIC 206 | Age: 54
Setting detail: DERMATOLOGY
End: 2024-10-02

## 2024-09-10 DIAGNOSIS — L738 OTHER SPECIFIED DISEASES OF HAIR AND HAIR FOLLICLES: ICD-10-CM

## 2024-09-10 DIAGNOSIS — L21.8 OTHER SEBORRHEIC DERMATITIS: ICD-10-CM

## 2024-09-10 DIAGNOSIS — L663 OTHER SPECIFIED DISEASES OF HAIR AND HAIR FOLLICLES: ICD-10-CM

## 2024-09-10 PROBLEM — L02.425 FURUNCLE OF RIGHT LOWER LIMB: Status: ACTIVE | Noted: 2024-09-10

## 2024-09-10 PROCEDURE — OTHER PRESCRIPTION: OTHER

## 2024-09-10 PROCEDURE — 99214 OFFICE O/P EST MOD 30 MIN: CPT

## 2024-09-10 PROCEDURE — OTHER MIPS QUALITY: OTHER

## 2024-09-10 PROCEDURE — OTHER COUNSELING: OTHER

## 2024-09-10 RX ORDER — KETOCONAZOLE 20 MG/ML
SHAMPOO, SUSPENSION TOPICAL
Qty: 120 | Refills: 11 | Status: ERX | COMMUNITY
Start: 2024-09-10

## 2024-09-10 RX ORDER — CLOBETASOL PROPIONATE 0.5 MG/ML
SOLUTION TOPICAL BID
Qty: 50 | Refills: 5 | Status: ERX | COMMUNITY
Start: 2024-09-10

## 2024-09-10 ASSESSMENT — LOCATION SIMPLE DESCRIPTION DERM
LOCATION SIMPLE: POSTERIOR SCALP
LOCATION SIMPLE: RIGHT THIGH

## 2024-09-10 ASSESSMENT — LOCATION ZONE DERM
LOCATION ZONE: LEG
LOCATION ZONE: SCALP

## 2024-09-10 ASSESSMENT — LOCATION DETAILED DESCRIPTION DERM
LOCATION DETAILED: MID-OCCIPITAL SCALP
LOCATION DETAILED: RIGHT ANTERIOR PROXIMAL THIGH

## 2024-09-10 NOTE — HPI: RASH
Is The Patient Presenting As Previously Scheduled?: No, they are coming in before their scheduled appointment
Is This A New Presentation, Or A Follow-Up?: Rash
Additional History: Scalp rash or infection .   Has had in the past

## 2024-09-10 NOTE — HPI: EVALUATION OF SKIN LESION(S)
Have Your Spot(S) Been Treated In The Past?: has not been treated
Hpi Title: Evaluation of Skin Lesions
Additional History: .? Warts on thigh

## 2024-10-01 ENCOUNTER — APPOINTMENT (OUTPATIENT)
Dept: URBAN - METROPOLITAN AREA CLINIC 206 | Age: 54
Setting detail: DERMATOLOGY
End: 2024-10-01

## 2024-10-01 DIAGNOSIS — L72.8 OTHER FOLLICULAR CYSTS OF THE SKIN AND SUBCUTANEOUS TISSUE: ICD-10-CM

## 2024-10-01 PROBLEM — R68.89 OTHER GENERAL SYMPTOMS AND SIGNS: Status: ACTIVE | Noted: 2024-10-01

## 2024-10-01 PROCEDURE — OTHER ORDER TESTS: OTHER

## 2024-10-01 PROCEDURE — OTHER OTHER: OTHER

## 2024-10-01 PROCEDURE — OTHER PRESCRIPTION: OTHER

## 2024-10-01 PROCEDURE — OTHER PRESCRIPTION MEDICATION MANAGEMENT: OTHER

## 2024-10-01 PROCEDURE — OTHER MIPS QUALITY: OTHER

## 2024-10-01 PROCEDURE — OTHER COUNSELING: OTHER

## 2024-10-01 PROCEDURE — 99213 OFFICE O/P EST LOW 20 MIN: CPT

## 2024-10-01 RX ORDER — MUPIROCIN 20 MG/G
OINTMENT TOPICAL QD
Qty: 22 | Refills: 1 | Status: ERX | COMMUNITY
Start: 2024-10-01

## 2024-10-01 RX ORDER — DOXYCYCLINE HYCLATE 100 MG/1
TABLET, COATED ORAL
Qty: 14 | Refills: 0 | Status: ERX | COMMUNITY
Start: 2024-10-01

## 2024-10-01 ASSESSMENT — LOCATION DETAILED DESCRIPTION DERM: LOCATION DETAILED: RIGHT INFERIOR CENTRAL MALAR CHEEK

## 2024-10-01 ASSESSMENT — LOCATION ZONE DERM: LOCATION ZONE: FACE

## 2024-10-01 ASSESSMENT — LOCATION SIMPLE DESCRIPTION DERM: LOCATION SIMPLE: RIGHT CHEEK

## 2024-10-01 NOTE — HPI: SKIN LESION
Is This A New Presentation, Or A Follow-Up?: Skin Lesion
Additional History: Thought it was a bug bite as it was painful and it itched. States its deeper now. Over the weekend it had a “halo” around it
How Severe Is Your Skin Lesion?: mild
Additional History: Noticed lesion yesterday

## 2024-10-01 NOTE — PROCEDURE: PRESCRIPTION MEDICATION MANAGEMENT
Initiate Treatment: -Doxycycline 100mg take 1 pill twice daily for 7 days \\n-Mupirocin ointment apply twice daily to cyst until clear, keep covered with bandaid
Plan: -nasal culture done to rule out staph, results take 10-14 business days, we will call or text you
Detail Level: Zone
Render In Strict Bullet Format?: Yes

## 2024-10-01 NOTE — PROCEDURE: ORDER TESTS
Expected Date Of Service: 10/01/2024
Performing Laboratory: [id number]
Billing Type: Third-Party Bill
Lab Facility: [id number]
Bill For Surgical Tray: no

## 2025-01-28 ENCOUNTER — APPOINTMENT (OUTPATIENT)
Dept: URBAN - METROPOLITAN AREA CLINIC 206 | Age: 55
Setting detail: DERMATOLOGY
End: 2025-02-05

## 2025-01-28 DIAGNOSIS — L01.01 NON-BULLOUS IMPETIGO: ICD-10-CM

## 2025-01-28 PROCEDURE — 99213 OFFICE O/P EST LOW 20 MIN: CPT

## 2025-01-28 PROCEDURE — OTHER MIPS QUALITY: OTHER

## 2025-01-28 PROCEDURE — OTHER COUNSELING: OTHER

## 2025-01-28 PROCEDURE — OTHER PRESCRIPTION: OTHER

## 2025-01-28 RX ORDER — MUPIROCIN 20 MG/G
OINTMENT TOPICAL
Qty: 22 | Refills: 2 | Status: ERX

## 2025-01-28 RX ORDER — DOXYCYCLINE HYCLATE 100 MG/1
TABLET, COATED ORAL
Qty: 14 | Refills: 0 | Status: ERX

## 2025-01-28 ASSESSMENT — LOCATION SIMPLE DESCRIPTION DERM
LOCATION SIMPLE: RIGHT CHEEK
LOCATION SIMPLE: LEFT CHEEK

## 2025-01-28 ASSESSMENT — LOCATION ZONE DERM: LOCATION ZONE: FACE

## 2025-01-28 ASSESSMENT — LOCATION DETAILED DESCRIPTION DERM
LOCATION DETAILED: LEFT INFERIOR MEDIAL MALAR CHEEK
LOCATION DETAILED: RIGHT INFERIOR MEDIAL MALAR CHEEK

## 2025-01-28 NOTE — HPI: SKIN LESION
How Severe Is Your Skin Lesion?: moderate
Is This A New Presentation, Or A Follow-Up?: Skin Lesions
Additional History: Concerned about lesions as they were bleeding

## 2025-02-26 ENCOUNTER — RX ONLY (RX ONLY)
Age: 55
End: 2025-02-26

## 2025-02-26 RX ORDER — MUPIROCIN 20 MG/G
OINTMENT TOPICAL
Qty: 22 | Refills: 2 | Status: ERX

## 2025-02-26 RX ORDER — DOXYCYCLINE HYCLATE 100 MG/1
TABLET, COATED ORAL
Qty: 14 | Refills: 0 | Status: ERX

## 2025-04-01 ENCOUNTER — APPOINTMENT (OUTPATIENT)
Dept: URBAN - METROPOLITAN AREA CLINIC 206 | Age: 55
Setting detail: DERMATOLOGY
End: 2025-04-01

## 2025-04-01 DIAGNOSIS — D22 MELANOCYTIC NEVI: ICD-10-CM

## 2025-04-01 DIAGNOSIS — L57.8 OTHER SKIN CHANGES DUE TO CHRONIC EXPOSURE TO NONIONIZING RADIATION: ICD-10-CM

## 2025-04-01 DIAGNOSIS — L82.1 OTHER SEBORRHEIC KERATOSIS: ICD-10-CM

## 2025-04-01 DIAGNOSIS — Z87.2 PERSONAL HISTORY OF DISEASES OF THE SKIN AND SUBCUTANEOUS TISSUE: ICD-10-CM

## 2025-04-01 DIAGNOSIS — D18.0 HEMANGIOMA: ICD-10-CM

## 2025-04-01 PROBLEM — D22.9 MELANOCYTIC NEVI, UNSPECIFIED: Status: ACTIVE | Noted: 2025-04-01

## 2025-04-01 PROBLEM — D18.01 HEMANGIOMA OF SKIN AND SUBCUTANEOUS TISSUE: Status: ACTIVE | Noted: 2025-04-01

## 2025-04-01 PROCEDURE — OTHER COUNSELING: OTHER

## 2025-04-01 PROCEDURE — 99213 OFFICE O/P EST LOW 20 MIN: CPT

## 2025-04-01 PROCEDURE — OTHER MIPS QUALITY: OTHER

## 2025-04-01 ASSESSMENT — LOCATION DETAILED DESCRIPTION DERM
LOCATION DETAILED: LEFT LATERAL SUPERIOR CHEST
LOCATION DETAILED: UPPER STERNUM
LOCATION DETAILED: RIGHT SUPERIOR LATERAL MIDBACK

## 2025-04-01 ASSESSMENT — LOCATION SIMPLE DESCRIPTION DERM
LOCATION SIMPLE: CHEST
LOCATION SIMPLE: RIGHT LOWER BACK

## 2025-04-01 ASSESSMENT — LOCATION ZONE DERM: LOCATION ZONE: TRUNK

## 2025-04-01 NOTE — HPI: FULL BODY SKIN EXAMINATION
How Severe Are Your Spot(S)?: moderate
What Is The Reason For Today's Visit?: Full Body Skin Examination with No Concerns
What Is The Reason For Today's Visit? (Being Monitored For X): concerning skin lesions on an annual basis
Patient s/p LHC via LFA. Dressing to Left groin site benign. No s/s of bleeding or hematoma. LLE warm and mobile. VS Stable. Patient denies pain. Discharge instructions reviewed with patient (and with daughter in law Kadie via telephone). SL removed. Patient pending transport to the [facility] at this time to meet his transport home

## 2025-07-22 ENCOUNTER — APPOINTMENT (OUTPATIENT)
Dept: URBAN - METROPOLITAN AREA CLINIC 206 | Age: 55
Setting detail: DERMATOLOGY
End: 2025-07-23

## 2025-07-22 DIAGNOSIS — D22 MELANOCYTIC NEVI: ICD-10-CM

## 2025-07-22 DIAGNOSIS — L82.1 OTHER SEBORRHEIC KERATOSIS: ICD-10-CM

## 2025-07-22 PROBLEM — D22.4 MELANOCYTIC NEVI OF SCALP AND NECK: Status: ACTIVE | Noted: 2025-07-22

## 2025-07-22 PROCEDURE — 99212 OFFICE O/P EST SF 10 MIN: CPT

## 2025-07-22 PROCEDURE — OTHER MIPS QUALITY: OTHER

## 2025-07-22 PROCEDURE — OTHER COUNSELING: OTHER

## 2025-07-22 PROCEDURE — OTHER REASSURANCE: OTHER

## 2025-07-22 ASSESSMENT — LOCATION ZONE DERM: LOCATION ZONE: NECK

## 2025-07-22 ASSESSMENT — LOCATION SIMPLE DESCRIPTION DERM: LOCATION SIMPLE: RIGHT ANTERIOR NECK

## 2025-07-22 ASSESSMENT — LOCATION DETAILED DESCRIPTION DERM: LOCATION DETAILED: RIGHT SUPERIOR LATERAL NECK

## 2025-08-12 ENCOUNTER — APPOINTMENT (OUTPATIENT)
Dept: URBAN - METROPOLITAN AREA CLINIC 206 | Age: 55
Setting detail: DERMATOLOGY
End: 2025-08-16

## 2025-08-12 DIAGNOSIS — L82.0 INFLAMED SEBORRHEIC KERATOSIS: ICD-10-CM

## 2025-08-12 PROCEDURE — 17110 DESTRUCT B9 LESION 1-14: CPT

## 2025-08-12 PROCEDURE — OTHER COUNSELING: OTHER

## 2025-08-12 PROCEDURE — OTHER MIPS QUALITY: OTHER

## 2025-08-12 PROCEDURE — OTHER LIQUID NITROGEN: OTHER

## 2025-08-12 ASSESSMENT — LOCATION DETAILED DESCRIPTION DERM
LOCATION DETAILED: RIGHT KNEE
LOCATION DETAILED: LEFT DISTAL POSTERIOR THIGH
LOCATION DETAILED: RIGHT DISTAL POSTERIOR THIGH
LOCATION DETAILED: RIGHT ANTERIOR MEDIAL MALLEOLUS

## 2025-08-12 ASSESSMENT — LOCATION SIMPLE DESCRIPTION DERM
LOCATION SIMPLE: RIGHT POSTERIOR THIGH
LOCATION SIMPLE: RIGHT ANKLE
LOCATION SIMPLE: LEFT POSTERIOR THIGH
LOCATION SIMPLE: RIGHT KNEE

## 2025-08-12 ASSESSMENT — LOCATION ZONE DERM: LOCATION ZONE: LEG

## 2025-08-26 ENCOUNTER — APPOINTMENT (OUTPATIENT)
Dept: URBAN - METROPOLITAN AREA CLINIC 206 | Age: 55
Setting detail: DERMATOLOGY
End: 2025-09-03

## 2025-08-26 DIAGNOSIS — L21.8 OTHER SEBORRHEIC DERMATITIS: ICD-10-CM

## 2025-08-26 PROCEDURE — OTHER PRESCRIPTION: OTHER

## 2025-08-26 PROCEDURE — OTHER PRESCRIPTION MEDICATION MANAGEMENT: OTHER

## 2025-08-26 PROCEDURE — OTHER COUNSELING: OTHER

## 2025-08-26 PROCEDURE — OTHER MIPS QUALITY: OTHER

## 2025-08-26 PROCEDURE — 99214 OFFICE O/P EST MOD 30 MIN: CPT

## 2025-08-26 RX ORDER — CLOBETASOL PROPIONATE 0.5 MG/ML
SOLUTION TOPICAL
Qty: 50 | Refills: 3 | Status: ERX | COMMUNITY
Start: 2025-08-26

## 2025-08-26 RX ORDER — KETOCONAZOLE 20 MG/ML
SHAMPOO, SUSPENSION TOPICAL
Qty: 120 | Refills: 6 | Status: ERX | COMMUNITY
Start: 2025-08-26

## 2025-08-26 ASSESSMENT — LOCATION DETAILED DESCRIPTION DERM: LOCATION DETAILED: RIGHT SUPERIOR PARIETAL SCALP

## 2025-08-26 ASSESSMENT — LOCATION ZONE DERM: LOCATION ZONE: SCALP

## 2025-08-26 ASSESSMENT — LOCATION SIMPLE DESCRIPTION DERM: LOCATION SIMPLE: SCALP
